# Patient Record
Sex: FEMALE | Race: WHITE | NOT HISPANIC OR LATINO | Employment: FULL TIME | URBAN - METROPOLITAN AREA
[De-identification: names, ages, dates, MRNs, and addresses within clinical notes are randomized per-mention and may not be internally consistent; named-entity substitution may affect disease eponyms.]

---

## 2021-07-21 ENCOUNTER — HOSPITAL ENCOUNTER (OUTPATIENT)
Facility: HOSPITAL | Age: 51
Discharge: HOME | End: 2021-07-23
Attending: EMERGENCY MEDICINE | Admitting: INTERNAL MEDICINE

## 2021-07-21 ENCOUNTER — APPOINTMENT (OUTPATIENT)
Dept: GENERAL RADIOLOGY | Facility: HOSPITAL | Age: 51
End: 2021-07-21

## 2021-07-21 DIAGNOSIS — Z86.39 HISTORY OF HYPERLIPIDEMIA: ICD-10-CM

## 2021-07-21 DIAGNOSIS — I20.0 UNSTABLE ANGINA (HCC): Primary | ICD-10-CM

## 2021-07-21 DIAGNOSIS — Z86.79 HISTORY OF HYPERTENSION: ICD-10-CM

## 2021-07-21 DIAGNOSIS — F17.200 SMOKER: ICD-10-CM

## 2021-07-21 LAB
ALBUMIN SERPL-MCNC: 4.2 G/DL (ref 3.5–5.2)
ALBUMIN/GLOB SERPL: 1.5 G/DL
ALP SERPL-CCNC: 67 U/L (ref 39–117)
ALT SERPL W P-5'-P-CCNC: 19 U/L (ref 1–33)
ANION GAP SERPL CALCULATED.3IONS-SCNC: 14 MMOL/L (ref 5–15)
AST SERPL-CCNC: 21 U/L (ref 1–32)
B-HCG UR QL: NEGATIVE
BASOPHILS # BLD AUTO: 0.1 10*3/MM3 (ref 0–0.2)
BASOPHILS NFR BLD AUTO: 0.8 % (ref 0–1.5)
BILIRUB SERPL-MCNC: 0.2 MG/DL (ref 0–1.2)
BUN SERPL-MCNC: 14 MG/DL (ref 6–20)
BUN/CREAT SERPL: 18.2 (ref 7–25)
CALCIUM SPEC-SCNC: 9.3 MG/DL (ref 8.6–10.5)
CHLORIDE SERPL-SCNC: 104 MMOL/L (ref 98–107)
CO2 SERPL-SCNC: 23 MMOL/L (ref 22–29)
CREAT SERPL-MCNC: 0.77 MG/DL (ref 0.57–1)
DEPRECATED RDW RBC AUTO: 49.2 FL (ref 37–54)
EOSINOPHIL # BLD AUTO: 0.52 10*3/MM3 (ref 0–0.4)
EOSINOPHIL NFR BLD AUTO: 4.2 % (ref 0.3–6.2)
ERYTHROCYTE [DISTWIDTH] IN BLOOD BY AUTOMATED COUNT: 14.4 % (ref 12.3–15.4)
GFR SERPL CREATININE-BSD FRML MDRD: 79 ML/MIN/1.73
GLOBULIN UR ELPH-MCNC: 2.8 GM/DL
GLUCOSE SERPL-MCNC: 79 MG/DL (ref 65–99)
HCT VFR BLD AUTO: 41.6 % (ref 34–46.6)
HGB BLD-MCNC: 13.2 G/DL (ref 12–15.9)
HOLD SPECIMEN: NORMAL
HOLD SPECIMEN: NORMAL
IMM GRANULOCYTES # BLD AUTO: 0.04 10*3/MM3 (ref 0–0.05)
IMM GRANULOCYTES NFR BLD AUTO: 0.3 % (ref 0–0.5)
INTERNAL NEGATIVE CONTROL: NORMAL
INTERNAL POSITIVE CONTROL: NORMAL
LIPASE SERPL-CCNC: 48 U/L (ref 13–60)
LYMPHOCYTES # BLD AUTO: 4.01 10*3/MM3 (ref 0.7–3.1)
LYMPHOCYTES NFR BLD AUTO: 32.5 % (ref 19.6–45.3)
Lab: NORMAL
MCH RBC QN AUTO: 29.7 PG (ref 26.6–33)
MCHC RBC AUTO-ENTMCNC: 31.7 G/DL (ref 31.5–35.7)
MCV RBC AUTO: 93.7 FL (ref 79–97)
MONOCYTES # BLD AUTO: 0.88 10*3/MM3 (ref 0.1–0.9)
MONOCYTES NFR BLD AUTO: 7.1 % (ref 5–12)
NEUTROPHILS NFR BLD AUTO: 55.1 % (ref 42.7–76)
NEUTROPHILS NFR BLD AUTO: 6.79 10*3/MM3 (ref 1.7–7)
NRBC BLD AUTO-RTO: 0 /100 WBC (ref 0–0.2)
NT-PROBNP SERPL-MCNC: 17.7 PG/ML (ref 0–900)
PLAT MORPH BLD: NORMAL
PLATELET # BLD AUTO: 288 10*3/MM3 (ref 140–450)
PMV BLD AUTO: 11 FL (ref 6–12)
POTASSIUM SERPL-SCNC: 3.6 MMOL/L (ref 3.5–5.2)
PROT SERPL-MCNC: 7 G/DL (ref 6–8.5)
RBC # BLD AUTO: 4.44 10*6/MM3 (ref 3.77–5.28)
RBC MORPH BLD: NORMAL
SODIUM SERPL-SCNC: 141 MMOL/L (ref 136–145)
TROPONIN T SERPL-MCNC: <0.01 NG/ML (ref 0–0.03)
WBC # BLD AUTO: 12.34 10*3/MM3 (ref 3.4–10.8)
WBC MORPH BLD: NORMAL
WHOLE BLOOD HOLD SPECIMEN: NORMAL

## 2021-07-21 PROCEDURE — 85007 BL SMEAR W/DIFF WBC COUNT: CPT

## 2021-07-21 PROCEDURE — 81025 URINE PREGNANCY TEST: CPT | Performed by: EMERGENCY MEDICINE

## 2021-07-21 PROCEDURE — 93005 ELECTROCARDIOGRAM TRACING: CPT | Performed by: EMERGENCY MEDICINE

## 2021-07-21 PROCEDURE — 85025 COMPLETE CBC W/AUTO DIFF WBC: CPT

## 2021-07-21 PROCEDURE — 93005 ELECTROCARDIOGRAM TRACING: CPT

## 2021-07-21 PROCEDURE — 71045 X-RAY EXAM CHEST 1 VIEW: CPT

## 2021-07-21 PROCEDURE — 80053 COMPREHEN METABOLIC PANEL: CPT

## 2021-07-21 PROCEDURE — 83880 ASSAY OF NATRIURETIC PEPTIDE: CPT

## 2021-07-21 PROCEDURE — 84484 ASSAY OF TROPONIN QUANT: CPT

## 2021-07-21 PROCEDURE — 99284 EMERGENCY DEPT VISIT MOD MDM: CPT

## 2021-07-21 PROCEDURE — 83690 ASSAY OF LIPASE: CPT

## 2021-07-21 PROCEDURE — 81025 URINE PREGNANCY TEST: CPT

## 2021-07-21 RX ORDER — CHOLECALCIFEROL (VITAMIN D3) 1250 MCG
CAPSULE ORAL
COMMUNITY

## 2021-07-21 RX ORDER — ASPIRIN 81 MG/1
324 TABLET, CHEWABLE ORAL ONCE
Status: COMPLETED | OUTPATIENT
Start: 2021-07-21 | End: 2021-07-21

## 2021-07-21 RX ORDER — SODIUM CHLORIDE 0.9 % (FLUSH) 0.9 %
10 SYRINGE (ML) INJECTION AS NEEDED
Status: DISCONTINUED | OUTPATIENT
Start: 2021-07-21 | End: 2021-07-23 | Stop reason: HOSPADM

## 2021-07-21 RX ORDER — ATORVASTATIN CALCIUM 40 MG/1
40 TABLET, FILM COATED ORAL DAILY
COMMUNITY

## 2021-07-21 RX ORDER — SUCRALFATE 1 G/1
1 TABLET ORAL 4 TIMES DAILY
COMMUNITY
End: 2021-09-01

## 2021-07-21 RX ORDER — ASPIRIN 81 MG/1
81 TABLET ORAL DAILY
COMMUNITY

## 2021-07-21 RX ORDER — PAROXETINE HYDROCHLORIDE 40 MG/1
40 TABLET, FILM COATED ORAL EVERY MORNING
COMMUNITY

## 2021-07-21 RX ORDER — MULTIPLE VITAMINS W/ MINERALS TAB 9MG-400MCG
1 TAB ORAL DAILY
COMMUNITY
End: 2022-01-20

## 2021-07-21 RX ORDER — CHOLECALCIFEROL (VITAMIN D3) 125 MCG
500 CAPSULE ORAL 3 TIMES WEEKLY
COMMUNITY
End: 2022-01-20

## 2021-07-21 RX ORDER — BISOPROLOL FUMARATE 5 MG/1
5 TABLET, FILM COATED ORAL DAILY
COMMUNITY
End: 2022-01-20

## 2021-07-21 RX ORDER — LISINOPRIL AND HYDROCHLOROTHIAZIDE 12.5; 1 MG/1; MG/1
1 TABLET ORAL 2 TIMES DAILY
COMMUNITY
End: 2021-09-01 | Stop reason: SDUPTHER

## 2021-07-21 RX ORDER — NITROGLYCERIN 0.4 MG/1
0.4 TABLET SUBLINGUAL
COMMUNITY
End: 2021-09-01

## 2021-07-21 RX ADMIN — ASPIRIN 81 MG CHEWABLE TABLET 243 MG: 81 TABLET CHEWABLE at 22:42

## 2021-07-22 ENCOUNTER — APPOINTMENT (OUTPATIENT)
Dept: CARDIOLOGY | Facility: HOSPITAL | Age: 51
End: 2021-07-22

## 2021-07-22 PROBLEM — F41.9 ANXIETY: Status: ACTIVE | Noted: 2021-07-22

## 2021-07-22 PROBLEM — I25.118 CORONARY ARTERY DISEASE OF NATIVE ARTERY OF NATIVE HEART WITH STABLE ANGINA PECTORIS: Status: ACTIVE | Noted: 2021-07-22

## 2021-07-22 PROBLEM — Z72.0 TOBACCO USE: Status: ACTIVE | Noted: 2021-07-22

## 2021-07-22 PROBLEM — I10 HTN (HYPERTENSION): Status: ACTIVE | Noted: 2021-07-22

## 2021-07-22 PROBLEM — E78.5 HLD (HYPERLIPIDEMIA): Status: ACTIVE | Noted: 2021-07-22

## 2021-07-22 PROBLEM — I20.9 ANGINA PECTORIS (HCC): Status: ACTIVE | Noted: 2021-07-22

## 2021-07-22 LAB
ANION GAP SERPL CALCULATED.3IONS-SCNC: 12 MMOL/L (ref 5–15)
ASCENDING AORTA: 2.9 CM
BASOPHILS # BLD AUTO: 0.08 10*3/MM3 (ref 0–0.2)
BASOPHILS NFR BLD AUTO: 0.8 % (ref 0–1.5)
BH CV ECHO MEAS - AO MAX PG (FULL): 2.3 MMHG
BH CV ECHO MEAS - AO MAX PG: 6.3 MMHG
BH CV ECHO MEAS - AO MEAN PG (FULL): 1.6 MMHG
BH CV ECHO MEAS - AO MEAN PG: 3.4 MMHG
BH CV ECHO MEAS - AO ROOT AREA (BSA CORRECTED): 1.6
BH CV ECHO MEAS - AO ROOT AREA: 7 CM^2
BH CV ECHO MEAS - AO ROOT DIAM: 3 CM
BH CV ECHO MEAS - AO V2 MAX: 126 CM/SEC
BH CV ECHO MEAS - AO V2 MEAN: 86 CM/SEC
BH CV ECHO MEAS - AO V2 VTI: 26.3 CM
BH CV ECHO MEAS - ASC AORTA: 2.9 CM
BH CV ECHO MEAS - AVA(I,A): 2.1 CM^2
BH CV ECHO MEAS - AVA(I,D): 2.1 CM^2
BH CV ECHO MEAS - AVA(V,A): 2 CM^2
BH CV ECHO MEAS - AVA(V,D): 2 CM^2
BH CV ECHO MEAS - BSA(HAYCOCK): 1.9 M^2
BH CV ECHO MEAS - BSA: 1.8 M^2
BH CV ECHO MEAS - BZI_BMI: 30 KILOGRAMS/M^2
BH CV ECHO MEAS - BZI_METRIC_HEIGHT: 162.6 CM
BH CV ECHO MEAS - BZI_METRIC_WEIGHT: 79.4 KG
BH CV ECHO MEAS - EDV(CUBED): 92.1 ML
BH CV ECHO MEAS - EDV(MOD-SP2): 122 ML
BH CV ECHO MEAS - EDV(MOD-SP4): 101 ML
BH CV ECHO MEAS - EDV(TEICH): 93.2 ML
BH CV ECHO MEAS - EF(CUBED): 83.7 %
BH CV ECHO MEAS - EF(MOD-BP): 62 %
BH CV ECHO MEAS - EF(MOD-SP2): 63.1 %
BH CV ECHO MEAS - EF(MOD-SP4): 58.4 %
BH CV ECHO MEAS - EF(TEICH): 76.8 %
BH CV ECHO MEAS - ESV(CUBED): 15 ML
BH CV ECHO MEAS - ESV(MOD-SP2): 45 ML
BH CV ECHO MEAS - ESV(MOD-SP4): 42 ML
BH CV ECHO MEAS - ESV(TEICH): 21.6 ML
BH CV ECHO MEAS - FS: 45.4 %
BH CV ECHO MEAS - IVS/LVPW: 0.8
BH CV ECHO MEAS - IVSD: 0.72 CM
BH CV ECHO MEAS - LA DIMENSION: 3.2 CM
BH CV ECHO MEAS - LA/AO: 1.1
BH CV ECHO MEAS - LAD MAJOR: 4.9 CM
BH CV ECHO MEAS - LAT PEAK E' VEL: 12 CM/SEC
BH CV ECHO MEAS - LATERAL E/E' RATIO: 10.7
BH CV ECHO MEAS - LV DIASTOLIC VOL/BSA (35-75): 54.7 ML/M^2
BH CV ECHO MEAS - LV IVRT: 0.07 SEC
BH CV ECHO MEAS - LV MASS(C)D: 116.8 GRAMS
BH CV ECHO MEAS - LV MASS(C)DI: 63.2 GRAMS/M^2
BH CV ECHO MEAS - LV MAX PG: 4 MMHG
BH CV ECHO MEAS - LV MEAN PG: 1.9 MMHG
BH CV ECHO MEAS - LV SYSTOLIC VOL/BSA (12-30): 22.7 ML/M^2
BH CV ECHO MEAS - LV V1 MAX: 100.6 CM/SEC
BH CV ECHO MEAS - LV V1 MEAN: 64.4 CM/SEC
BH CV ECHO MEAS - LV V1 VTI: 22.2 CM
BH CV ECHO MEAS - LVIDD: 4.5 CM
BH CV ECHO MEAS - LVIDS: 2.5 CM
BH CV ECHO MEAS - LVLD AP2: 8.5 CM
BH CV ECHO MEAS - LVLD AP4: 8.5 CM
BH CV ECHO MEAS - LVLS AP2: 7.2 CM
BH CV ECHO MEAS - LVLS AP4: 7.1 CM
BH CV ECHO MEAS - LVOT AREA (M): 2.5 CM^2
BH CV ECHO MEAS - LVOT AREA: 2.5 CM^2
BH CV ECHO MEAS - LVOT DIAM: 1.8 CM
BH CV ECHO MEAS - LVPWD: 0.9 CM
BH CV ECHO MEAS - MED PEAK E' VEL: 10.2 CM/SEC
BH CV ECHO MEAS - MEDIAL E/E' RATIO: 12.6
BH CV ECHO MEAS - MV A MAX VEL: 73.4 CM/SEC
BH CV ECHO MEAS - MV DEC SLOPE: 529.3 CM/SEC^2
BH CV ECHO MEAS - MV DEC TIME: 0.13 SEC
BH CV ECHO MEAS - MV E MAX VEL: 130.3 CM/SEC
BH CV ECHO MEAS - MV E/A: 1.8
BH CV ECHO MEAS - MV MAX PG: 4.9 MMHG
BH CV ECHO MEAS - MV MEAN PG: 1.9 MMHG
BH CV ECHO MEAS - MV P1/2T MAX VEL: 126 CM/SEC
BH CV ECHO MEAS - MV P1/2T: 69.7 MSEC
BH CV ECHO MEAS - MV V2 MAX: 110.8 CM/SEC
BH CV ECHO MEAS - MV V2 MEAN: 60.8 CM/SEC
BH CV ECHO MEAS - MV V2 VTI: 29.5 CM
BH CV ECHO MEAS - MVA P1/2T LCG: 1.7 CM^2
BH CV ECHO MEAS - MVA(P1/2T): 3.2 CM^2
BH CV ECHO MEAS - MVA(VTI): 1.9 CM^2
BH CV ECHO MEAS - PA ACC SLOPE: 502.3 CM/SEC^2
BH CV ECHO MEAS - PA ACC TIME: 0.12 SEC
BH CV ECHO MEAS - PA MAX PG: 1.8 MMHG
BH CV ECHO MEAS - PA PR(ACCEL): 25.1 MMHG
BH CV ECHO MEAS - PA V2 MAX: 67.1 CM/SEC
BH CV ECHO MEAS - RAP SYSTOLE: 3 MMHG
BH CV ECHO MEAS - RVSP: 17 MMHG
BH CV ECHO MEAS - SI(AO): 100.2 ML/M^2
BH CV ECHO MEAS - SI(CUBED): 41.7 ML/M^2
BH CV ECHO MEAS - SI(LVOT): 30.3 ML/M^2
BH CV ECHO MEAS - SI(MOD-SP2): 41.7 ML/M^2
BH CV ECHO MEAS - SI(MOD-SP4): 31.9 ML/M^2
BH CV ECHO MEAS - SI(TEICH): 38.8 ML/M^2
BH CV ECHO MEAS - SV(AO): 185.1 ML
BH CV ECHO MEAS - SV(CUBED): 77.1 ML
BH CV ECHO MEAS - SV(LVOT): 56 ML
BH CV ECHO MEAS - SV(MOD-SP2): 77 ML
BH CV ECHO MEAS - SV(MOD-SP4): 59 ML
BH CV ECHO MEAS - SV(TEICH): 71.6 ML
BH CV ECHO MEAS - TAPSE (>1.6): 2 CM
BH CV ECHO MEAS - TR MAX PG: 14 MMHG
BH CV ECHO MEAS - TR MAX VEL: 187 CM/SEC
BH CV ECHO MEASUREMENTS AVERAGE E/E' RATIO: 11.74
BH CV VAS BP RIGHT ARM: NORMAL MMHG
BH CV XLRA - RV BASE: 3.6 CM
BH CV XLRA - RV LENGTH: 6.9 CM
BH CV XLRA - RV MID: 2.2 CM
BH CV XLRA - TDI S': 12.3 CM/SEC
BILIRUB UR QL STRIP: NEGATIVE
BUN SERPL-MCNC: 12 MG/DL (ref 6–20)
BUN/CREAT SERPL: 15.8 (ref 7–25)
CALCIUM SPEC-SCNC: 8.8 MG/DL (ref 8.6–10.5)
CHLORIDE SERPL-SCNC: 108 MMOL/L (ref 98–107)
CHOLEST SERPL-MCNC: 137 MG/DL (ref 0–200)
CLARITY UR: CLEAR
CO2 SERPL-SCNC: 21 MMOL/L (ref 22–29)
COLOR UR: YELLOW
CREAT SERPL-MCNC: 0.76 MG/DL (ref 0.57–1)
D DIMER PPP FEU-MCNC: 0.52 MCGFEU/ML (ref 0–0.56)
DEPRECATED RDW RBC AUTO: 48.1 FL (ref 37–54)
EOSINOPHIL # BLD AUTO: 0.4 10*3/MM3 (ref 0–0.4)
EOSINOPHIL NFR BLD AUTO: 4.1 % (ref 0.3–6.2)
ERYTHROCYTE [DISTWIDTH] IN BLOOD BY AUTOMATED COUNT: 14.3 % (ref 12.3–15.4)
GFR SERPL CREATININE-BSD FRML MDRD: 80 ML/MIN/1.73
GLUCOSE SERPL-MCNC: 103 MG/DL (ref 65–99)
GLUCOSE UR STRIP-MCNC: NEGATIVE MG/DL
HBA1C MFR BLD: 5.2 % (ref 4.8–5.6)
HCT VFR BLD AUTO: 38.7 % (ref 34–46.6)
HDLC SERPL-MCNC: 38 MG/DL (ref 40–60)
HGB BLD-MCNC: 12.4 G/DL (ref 12–15.9)
HGB UR QL STRIP.AUTO: NEGATIVE
HOLD SPECIMEN: NORMAL
IMM GRANULOCYTES # BLD AUTO: 0.02 10*3/MM3 (ref 0–0.05)
IMM GRANULOCYTES NFR BLD AUTO: 0.2 % (ref 0–0.5)
IVRT: 65 MSEC
KETONES UR QL STRIP: NEGATIVE
LDLC SERPL CALC-MCNC: 83 MG/DL (ref 0–100)
LDLC/HDLC SERPL: 2.18 {RATIO}
LEFT ATRIUM VOLUME INDEX: 30.8 ML/M^2
LEFT ATRIUM VOLUME: 57 ML
LEUKOCYTE ESTERASE UR QL STRIP.AUTO: NEGATIVE
LYMPHOCYTES # BLD AUTO: 3.36 10*3/MM3 (ref 0.7–3.1)
LYMPHOCYTES NFR BLD AUTO: 34.7 % (ref 19.6–45.3)
MAGNESIUM SERPL-MCNC: 1.8 MG/DL (ref 1.6–2.6)
MAGNESIUM SERPL-MCNC: 1.9 MG/DL (ref 1.6–2.6)
MCH RBC QN AUTO: 29.6 PG (ref 26.6–33)
MCHC RBC AUTO-ENTMCNC: 32 G/DL (ref 31.5–35.7)
MCV RBC AUTO: 92.4 FL (ref 79–97)
MONOCYTES # BLD AUTO: 0.59 10*3/MM3 (ref 0.1–0.9)
MONOCYTES NFR BLD AUTO: 6.1 % (ref 5–12)
NEUTROPHILS NFR BLD AUTO: 5.24 10*3/MM3 (ref 1.7–7)
NEUTROPHILS NFR BLD AUTO: 54.1 % (ref 42.7–76)
NITRITE UR QL STRIP: NEGATIVE
NRBC BLD AUTO-RTO: 0 /100 WBC (ref 0–0.2)
PH UR STRIP.AUTO: 7 [PH] (ref 5–8)
PLATELET # BLD AUTO: 242 10*3/MM3 (ref 140–450)
PMV BLD AUTO: 11.1 FL (ref 6–12)
POTASSIUM SERPL-SCNC: 3.8 MMOL/L (ref 3.5–5.2)
PROCALCITONIN SERPL-MCNC: <0.02 NG/ML (ref 0–0.25)
PROT UR QL STRIP: NEGATIVE
QT INTERVAL: 356 MS
QT INTERVAL: 382 MS
QTC INTERVAL: 390 MS
QTC INTERVAL: 413 MS
RBC # BLD AUTO: 4.19 10*6/MM3 (ref 3.77–5.28)
SARS-COV-2 RNA RESP QL NAA+PROBE: NOT DETECTED
SODIUM SERPL-SCNC: 141 MMOL/L (ref 136–145)
SP GR UR STRIP: 1.01 (ref 1–1.03)
TRIGL SERPL-MCNC: 81 MG/DL (ref 0–150)
TROPONIN T SERPL-MCNC: <0.01 NG/ML (ref 0–0.03)
TROPONIN T SERPL-MCNC: <0.01 NG/ML (ref 0–0.03)
TSH SERPL DL<=0.05 MIU/L-ACNC: 1.27 UIU/ML (ref 0.27–4.2)
UROBILINOGEN UR QL STRIP: NORMAL
VLDLC SERPL-MCNC: 16 MG/DL (ref 5–40)
WBC # BLD AUTO: 9.69 10*3/MM3 (ref 3.4–10.8)

## 2021-07-22 PROCEDURE — 85379 FIBRIN DEGRADATION QUANT: CPT | Performed by: NURSE PRACTITIONER

## 2021-07-22 PROCEDURE — 80048 BASIC METABOLIC PNL TOTAL CA: CPT | Performed by: NURSE PRACTITIONER

## 2021-07-22 PROCEDURE — U0003 INFECTIOUS AGENT DETECTION BY NUCLEIC ACID (DNA OR RNA); SEVERE ACUTE RESPIRATORY SYNDROME CORONAVIRUS 2 (SARS-COV-2) (CORONAVIRUS DISEASE [COVID-19]), AMPLIFIED PROBE TECHNIQUE, MAKING USE OF HIGH THROUGHPUT TECHNOLOGIES AS DESCRIBED BY CMS-2020-01-R: HCPCS | Performed by: INTERNAL MEDICINE

## 2021-07-22 PROCEDURE — 99244 OFF/OP CNSLTJ NEW/EST MOD 40: CPT | Performed by: INTERNAL MEDICINE

## 2021-07-22 PROCEDURE — G0378 HOSPITAL OBSERVATION PER HR: HCPCS

## 2021-07-22 PROCEDURE — 93005 ELECTROCARDIOGRAM TRACING: CPT | Performed by: EMERGENCY MEDICINE

## 2021-07-22 PROCEDURE — C1753 CATH, INTRAVAS ULTRASOUND: HCPCS | Performed by: INTERNAL MEDICINE

## 2021-07-22 PROCEDURE — C1887 CATHETER, GUIDING: HCPCS | Performed by: INTERNAL MEDICINE

## 2021-07-22 PROCEDURE — C1769 GUIDE WIRE: HCPCS | Performed by: INTERNAL MEDICINE

## 2021-07-22 PROCEDURE — 84484 ASSAY OF TROPONIN QUANT: CPT | Performed by: INTERNAL MEDICINE

## 2021-07-22 PROCEDURE — 81003 URINALYSIS AUTO W/O SCOPE: CPT | Performed by: NURSE PRACTITIONER

## 2021-07-22 PROCEDURE — 96374 THER/PROPH/DIAG INJ IV PUSH: CPT

## 2021-07-22 PROCEDURE — 92978 ENDOLUMINL IVUS OCT C 1ST: CPT | Performed by: INTERNAL MEDICINE

## 2021-07-22 PROCEDURE — 84145 PROCALCITONIN (PCT): CPT | Performed by: NURSE PRACTITIONER

## 2021-07-22 PROCEDURE — C1894 INTRO/SHEATH, NON-LASER: HCPCS | Performed by: INTERNAL MEDICINE

## 2021-07-22 PROCEDURE — 25010000002 HEPARIN (PORCINE) PER 1000 UNITS: Performed by: INTERNAL MEDICINE

## 2021-07-22 PROCEDURE — 84484 ASSAY OF TROPONIN QUANT: CPT | Performed by: EMERGENCY MEDICINE

## 2021-07-22 PROCEDURE — 83036 HEMOGLOBIN GLYCOSYLATED A1C: CPT | Performed by: NURSE PRACTITIONER

## 2021-07-22 PROCEDURE — 96361 HYDRATE IV INFUSION ADD-ON: CPT

## 2021-07-22 PROCEDURE — 25010000003 LIDOCAINE 1 % SOLUTION: Performed by: INTERNAL MEDICINE

## 2021-07-22 PROCEDURE — 93005 ELECTROCARDIOGRAM TRACING: CPT | Performed by: NURSE PRACTITIONER

## 2021-07-22 PROCEDURE — 25010000002 MIDAZOLAM PER 1 MG: Performed by: INTERNAL MEDICINE

## 2021-07-22 PROCEDURE — 93458 L HRT ARTERY/VENTRICLE ANGIO: CPT | Performed by: INTERNAL MEDICINE

## 2021-07-22 PROCEDURE — 25010000002 FENTANYL CITRATE (PF) 50 MCG/ML SOLUTION: Performed by: INTERNAL MEDICINE

## 2021-07-22 PROCEDURE — 83735 ASSAY OF MAGNESIUM: CPT | Performed by: NURSE PRACTITIONER

## 2021-07-22 PROCEDURE — 99220 PR INITIAL OBSERVATION CARE/DAY 70 MINUTES: CPT | Performed by: NURSE PRACTITIONER

## 2021-07-22 PROCEDURE — 80061 LIPID PANEL: CPT | Performed by: NURSE PRACTITIONER

## 2021-07-22 PROCEDURE — 85025 COMPLETE CBC W/AUTO DIFF WBC: CPT | Performed by: NURSE PRACTITIONER

## 2021-07-22 PROCEDURE — 93010 ELECTROCARDIOGRAM REPORT: CPT | Performed by: INTERNAL MEDICINE

## 2021-07-22 PROCEDURE — 93306 TTE W/DOPPLER COMPLETE: CPT

## 2021-07-22 PROCEDURE — 84443 ASSAY THYROID STIM HORMONE: CPT | Performed by: NURSE PRACTITIONER

## 2021-07-22 PROCEDURE — 0 IOPAMIDOL PER 1 ML: Performed by: INTERNAL MEDICINE

## 2021-07-22 PROCEDURE — 93306 TTE W/DOPPLER COMPLETE: CPT | Performed by: INTERNAL MEDICINE

## 2021-07-22 PROCEDURE — 96376 TX/PRO/DX INJ SAME DRUG ADON: CPT

## 2021-07-22 RX ORDER — MIDAZOLAM HYDROCHLORIDE 1 MG/ML
INJECTION INTRAMUSCULAR; INTRAVENOUS AS NEEDED
Status: DISCONTINUED | OUTPATIENT
Start: 2021-07-22 | End: 2021-07-22 | Stop reason: HOSPADM

## 2021-07-22 RX ORDER — SODIUM CHLORIDE 0.9 % (FLUSH) 0.9 %
10 SYRINGE (ML) INJECTION AS NEEDED
Status: DISCONTINUED | OUTPATIENT
Start: 2021-07-22 | End: 2021-07-23 | Stop reason: HOSPADM

## 2021-07-22 RX ORDER — LANOLIN ALCOHOL/MO/W.PET/CERES
500 CREAM (GRAM) TOPICAL 3 TIMES WEEKLY
Status: DISCONTINUED | OUTPATIENT
Start: 2021-07-23 | End: 2021-07-23 | Stop reason: HOSPADM

## 2021-07-22 RX ORDER — LIDOCAINE HYDROCHLORIDE 10 MG/ML
INJECTION, SOLUTION INFILTRATION; PERINEURAL AS NEEDED
Status: DISCONTINUED | OUTPATIENT
Start: 2021-07-22 | End: 2021-07-22 | Stop reason: HOSPADM

## 2021-07-22 RX ORDER — SODIUM CHLORIDE 9 MG/ML
100 INJECTION, SOLUTION INTRAVENOUS CONTINUOUS
Status: ACTIVE | OUTPATIENT
Start: 2021-07-22 | End: 2021-07-22

## 2021-07-22 RX ORDER — ATORVASTATIN CALCIUM 40 MG/1
40 TABLET, FILM COATED ORAL NIGHTLY
Status: DISCONTINUED | OUTPATIENT
Start: 2021-07-22 | End: 2021-07-23 | Stop reason: HOSPADM

## 2021-07-22 RX ORDER — PAROXETINE HYDROCHLORIDE 20 MG/1
40 TABLET, FILM COATED ORAL DAILY
Status: DISCONTINUED | OUTPATIENT
Start: 2021-07-22 | End: 2021-07-23 | Stop reason: HOSPADM

## 2021-07-22 RX ORDER — HEPARIN SODIUM 1000 [USP'U]/ML
INJECTION, SOLUTION INTRAVENOUS; SUBCUTANEOUS AS NEEDED
Status: DISCONTINUED | OUTPATIENT
Start: 2021-07-22 | End: 2021-07-22 | Stop reason: HOSPADM

## 2021-07-22 RX ORDER — HYDROXYZINE HYDROCHLORIDE 25 MG/1
25 TABLET, FILM COATED ORAL 3 TIMES DAILY PRN
Status: DISCONTINUED | OUTPATIENT
Start: 2021-07-22 | End: 2021-07-23 | Stop reason: HOSPADM

## 2021-07-22 RX ORDER — AMOXICILLIN 250 MG
2 CAPSULE ORAL 2 TIMES DAILY
Status: DISCONTINUED | OUTPATIENT
Start: 2021-07-22 | End: 2021-07-23 | Stop reason: HOSPADM

## 2021-07-22 RX ORDER — SODIUM CHLORIDE 0.9 % (FLUSH) 0.9 %
10 SYRINGE (ML) INJECTION EVERY 12 HOURS SCHEDULED
Status: DISCONTINUED | OUTPATIENT
Start: 2021-07-22 | End: 2021-07-23 | Stop reason: HOSPADM

## 2021-07-22 RX ORDER — NICOTINE 21 MG/24HR
1 PATCH, TRANSDERMAL 24 HOURS TRANSDERMAL EVERY 24 HOURS
Status: DISCONTINUED | OUTPATIENT
Start: 2021-07-22 | End: 2021-07-23 | Stop reason: HOSPADM

## 2021-07-22 RX ORDER — BISACODYL 5 MG/1
5 TABLET, DELAYED RELEASE ORAL DAILY PRN
Status: DISCONTINUED | OUTPATIENT
Start: 2021-07-22 | End: 2021-07-23 | Stop reason: HOSPADM

## 2021-07-22 RX ORDER — ASPIRIN 81 MG/1
81 TABLET ORAL DAILY
Status: DISCONTINUED | OUTPATIENT
Start: 2021-07-22 | End: 2021-07-23 | Stop reason: HOSPADM

## 2021-07-22 RX ORDER — ACETAMINOPHEN 160 MG/5ML
650 SOLUTION ORAL EVERY 4 HOURS PRN
Status: DISCONTINUED | OUTPATIENT
Start: 2021-07-22 | End: 2021-07-23 | Stop reason: HOSPADM

## 2021-07-22 RX ORDER — PANTOPRAZOLE SODIUM 40 MG/1
40 TABLET, DELAYED RELEASE ORAL
Status: DISCONTINUED | OUTPATIENT
Start: 2021-07-22 | End: 2021-07-23 | Stop reason: HOSPADM

## 2021-07-22 RX ORDER — ACETAMINOPHEN 650 MG/1
650 SUPPOSITORY RECTAL EVERY 4 HOURS PRN
Status: DISCONTINUED | OUTPATIENT
Start: 2021-07-22 | End: 2021-07-23 | Stop reason: HOSPADM

## 2021-07-22 RX ORDER — FENTANYL CITRATE 50 UG/ML
INJECTION, SOLUTION INTRAMUSCULAR; INTRAVENOUS AS NEEDED
Status: DISCONTINUED | OUTPATIENT
Start: 2021-07-22 | End: 2021-07-22 | Stop reason: HOSPADM

## 2021-07-22 RX ORDER — ONDANSETRON 2 MG/ML
4 INJECTION INTRAMUSCULAR; INTRAVENOUS EVERY 6 HOURS PRN
Status: DISCONTINUED | OUTPATIENT
Start: 2021-07-22 | End: 2021-07-23 | Stop reason: HOSPADM

## 2021-07-22 RX ORDER — FAMOTIDINE 10 MG/ML
20 INJECTION, SOLUTION INTRAVENOUS EVERY 12 HOURS SCHEDULED
Status: COMPLETED | OUTPATIENT
Start: 2021-07-22 | End: 2021-07-22

## 2021-07-22 RX ORDER — POLYETHYLENE GLYCOL 3350 17 G/17G
17 POWDER, FOR SOLUTION ORAL DAILY PRN
Status: DISCONTINUED | OUTPATIENT
Start: 2021-07-22 | End: 2021-07-23 | Stop reason: HOSPADM

## 2021-07-22 RX ORDER — ACETAMINOPHEN 325 MG/1
650 TABLET ORAL EVERY 4 HOURS PRN
Status: DISCONTINUED | OUTPATIENT
Start: 2021-07-22 | End: 2021-07-23 | Stop reason: HOSPADM

## 2021-07-22 RX ORDER — SUCRALFATE 1 G/1
1 TABLET ORAL 4 TIMES DAILY
Status: DISCONTINUED | OUTPATIENT
Start: 2021-07-22 | End: 2021-07-23 | Stop reason: HOSPADM

## 2021-07-22 RX ORDER — BISOPROLOL FUMARATE 5 MG/1
5 TABLET, FILM COATED ORAL DAILY
Status: DISCONTINUED | OUTPATIENT
Start: 2021-07-22 | End: 2021-07-23 | Stop reason: HOSPADM

## 2021-07-22 RX ORDER — BISACODYL 10 MG
10 SUPPOSITORY, RECTAL RECTAL DAILY PRN
Status: DISCONTINUED | OUTPATIENT
Start: 2021-07-22 | End: 2021-07-23 | Stop reason: HOSPADM

## 2021-07-22 RX ORDER — MULTIPLE VITAMINS W/ MINERALS TAB 9MG-400MCG
1 TAB ORAL DAILY
Status: DISCONTINUED | OUTPATIENT
Start: 2021-07-22 | End: 2021-07-23 | Stop reason: HOSPADM

## 2021-07-22 RX ORDER — ONDANSETRON 4 MG/1
4 TABLET, FILM COATED ORAL EVERY 6 HOURS PRN
Status: DISCONTINUED | OUTPATIENT
Start: 2021-07-22 | End: 2021-07-23 | Stop reason: HOSPADM

## 2021-07-22 RX ORDER — SODIUM CHLORIDE 9 MG/ML
1 INJECTION, SOLUTION INTRAVENOUS CONTINUOUS
Status: ACTIVE | OUTPATIENT
Start: 2021-07-22 | End: 2021-07-22

## 2021-07-22 RX ADMIN — PAROXETINE HYDROCHLORIDE 40 MG: 20 TABLET, FILM COATED ORAL at 08:13

## 2021-07-22 RX ADMIN — BISOPROLOL FUMARATE 5 MG: 5 TABLET, FILM COATED ORAL at 08:12

## 2021-07-22 RX ADMIN — FAMOTIDINE 20 MG: 10 INJECTION, SOLUTION INTRAVENOUS at 08:13

## 2021-07-22 RX ADMIN — ATORVASTATIN CALCIUM 40 MG: 40 TABLET, FILM COATED ORAL at 03:08

## 2021-07-22 RX ADMIN — SODIUM CHLORIDE 1 ML/KG/HR: 9 INJECTION, SOLUTION INTRAVENOUS at 18:49

## 2021-07-22 RX ADMIN — PANTOPRAZOLE SODIUM 40 MG: 40 TABLET, DELAYED RELEASE ORAL at 03:08

## 2021-07-22 RX ADMIN — LIDOCAINE HYDROCHLORIDE: 20 SOLUTION ORAL; TOPICAL at 03:30

## 2021-07-22 RX ADMIN — SUCRALFATE 1 G: 1 TABLET ORAL at 20:25

## 2021-07-22 RX ADMIN — SODIUM CHLORIDE 100 ML/HR: 9 INJECTION, SOLUTION INTRAVENOUS at 01:22

## 2021-07-22 RX ADMIN — FAMOTIDINE 20 MG: 10 INJECTION, SOLUTION INTRAVENOUS at 03:08

## 2021-07-22 RX ADMIN — ATORVASTATIN CALCIUM 40 MG: 40 TABLET, FILM COATED ORAL at 20:29

## 2021-07-22 RX ADMIN — SODIUM CHLORIDE, PRESERVATIVE FREE 10 ML: 5 INJECTION INTRAVENOUS at 20:25

## 2021-07-22 RX ADMIN — SUCRALFATE 1 G: 1 TABLET ORAL at 08:13

## 2021-07-22 RX ADMIN — MULTIPLE VITAMINS W/ MINERALS TAB 1 TABLET: TAB at 08:12

## 2021-07-22 RX ADMIN — HYDROCHLOROTHIAZIDE: 12.5 CAPSULE ORAL at 08:12

## 2021-07-22 RX ADMIN — ASPIRIN 81 MG: 81 TABLET, COATED ORAL at 08:12

## 2021-07-22 RX ADMIN — DOCUSATE SODIUM 50MG AND SENNOSIDES 8.6MG 2 TABLET: 8.6; 5 TABLET, FILM COATED ORAL at 08:13

## 2021-07-22 RX ADMIN — SUCRALFATE 1 G: 1 TABLET ORAL at 18:48

## 2021-07-22 NOTE — CONSULTS
Inpatient Cardiology Consult  Consult performed by: Faye Mandel APRN  Consult ordered by: Nunu Bowen APRN          [facility] at [facility]  Cardiovascular Consultation Note    Reason for consult: Chest pain     Patient is a 51-year-old female with a history of hypertension, hyperlipidemia, ongoing tobacco abuse and anxiety who presented to [facility] last evening with reports of progressive chest pain with radiation to her left shoulder.  She went to [facility] in May for the same symptoms.  She reports ruling out for a heart attack.  She was evaluated by Dr. Mahajan with cardiology and underwent stress testing but she could not complete it due to shortness of breath and leg cramps. A cardiac catheterization was discussed but deferred at that time.  She did follow-up in the clinic with Dr. Mahajan who started her on Zebeta and ordered a 2-week monitor for complaints of lightheadedness.  She is not received the results of her monitor.  She reports her symptoms are provoked with exertion and improved with rest.  She reports a strong family history of coronary artery disease as her mother and father both required stents and CABG in their 50s.  She is a longtime 1 pack/day smoker x30 years.  Work-up includes troponins x3 negative and EKG shows no acute ischemia or injury pattern.  Patient has been treated with a full-strength aspirin.  She is currently lying flat in the bed breathing easy on room air but does still report some substernal chest discomfort.    Cardiac risk factors: Hypertension, hyperlipidemia, tobacco abuse, family history    Past medical and surgical history, social and family history reviewed in EMR.    REVIEW OF SYSTEMS:   H&P ROS reviewed and pertinent CV ROS as noted in HPI.         Vital Sign Min/Max for last 24 hours  Temp  Min: 98 °F (36.7 °C)  Max: 98.6 °F (37 °C)   BP  Min: 113/57  Max: 148/68   Pulse  Min: 62  Max: 84   Resp  Min: 14   Max: 18   SpO2  Min: 98 %  Max: 100 %   No data recorded    No intake or output data in the 24 hours ending 21 1038        Constitutional:       Appearance: Healthy appearance. Well-developed.   Eyes:      General: Lids are normal. No scleral icterus.     Conjunctiva/sclera: Conjunctivae normal.   HENT:      Head: Normocephalic and atraumatic.   Neck:      Thyroid: No thyromegaly.      Vascular: No carotid bruit or JVD.   Pulmonary:      Effort: Pulmonary effort is normal.      Breath sounds: Normal breath sounds. No wheezing. No rhonchi. No rales.   Cardiovascular:      Normal rate. Regular rhythm.      Murmurs: There is no murmur.      No gallop. No rub.   Pulses:     Intact distal pulses.   Edema:     Peripheral edema absent.   Abdominal:      General: There is no distension.      Palpations: Abdomen is soft. There is no abdominal mass.   Musculoskeletal:      Cervical back: Normal range of motion. Skin:     General: Skin is warm and dry.      Findings: No rash.   Neurological:      General: No focal deficit present.      Mental Status: Alert and oriented to person, place, and time.      Gait: Gait is intact.   Psychiatric:         Attention and Perception: Attention normal.         Mood and Affect: Mood normal.         Behavior: Behavior normal.     Right radial 2+, normal barbeau test  EK2021 normal sinus rhythm with sinus arrhythmia    Lab Review:   Labs reviewed in the electronic medical record.  Pertinent findings include:  Lab Results   Component Value Date    GLUCOSE 103 (H) 2021    BUN 12 2021    CREATININE 0.76 2021    EGFRIFNONA 80 2021    BCR 15.8 2021    K 3.8 2021    CO2 21.0 (L) 2021    CALCIUM 8.8 2021    ALBUMIN 4.20 2021    AST 21 2021    ALT 19 2021     Lab Results   Component Value Date    WBC 9.69 2021    HGB 12.4 2021    HCT 38.7 2021    MCV 92.4 2021     2021     Lab Results    Component Value Date    CHOL 137 07/22/2021    TRIG 81 07/22/2021    HDL 38 (L) 07/22/2021    LDL 83 07/22/2021     Results from last 7 days   Lab Units 07/22/21  0509   HEMOGLOBIN A1C % 5.20            Active Hospital Problems    Diagnosis    • Angina pectoris (CMS/HCC)      · Presentation [facility] 7/21/2021 with progressive chest pain.  Troponins x3 - and EKG without acute ischemia or injury     • Essential hypertension    • HLD (hyperlipidemia)    • Anxiety    • Tobacco use             · Patient has signs and symptoms concerning for unstable angina CCS class II-III.  · Keep n.p.o. for cardiac cath with Dr. Renny Gandara today  · Further recommendations to follow  · Smoking cessation strongly encouraged, assistance offered    The risks, benefits, and alternative options of cardiac catheterization were discussed with the patient.  The risks include death, MI, stroke, infection, vascular injury requiring surgical repair and/or blood transfusion, coronary dissection, renal dysfunction/failure, allergic reaction, emergent CABG.  If PCI is needed there is a 1-2% risk of emergent CABG.  The patient is agreeable for cardiac catheterization, possible PCI or CABG. Plan is to proceed with cardiac catheterization and possible PCI.     EMMANUELLE Atkinson scribe for Dr. Renny Gandara    I,Renny Gandara M.D., personally performed the services described in this documentation as scribed by the above named individual in my presence, and it is both accurate and complete.    Renny Gandara MD, [facility]  07/22/21  11:30 EDT

## 2021-07-22 NOTE — CASE MANAGEMENT/SOCIAL WORK
Continued Stay Note  [facility]     Patient Name: Sarai Machuca  MRN: [id number]  Today's Date: 7/22/2021    Admit Date: 7/21/2021    Discharge Plan     Row Name 07/22/21 1046       Plan    Plan  Home at DC    Patient/Family in Agreement with Plan  other (see comments)    Plan Comments  No DC needs identified at this time.    Final Discharge Disposition Code  01 - home or self-care    Row Name 07/22/21 0805       Plan    Final Discharge Disposition Code  01 - home or self-care    Row Name 07/22/21 0801       Plan    Final Discharge Disposition Code  01 - home or self-care        Discharge Codes    No documentation.       Expected Discharge Date and Time     Expected Discharge Date Expected Discharge Time    Jul 23, 2021             Rama Dotson RN

## 2021-07-22 NOTE — PLAN OF CARE
Goal Outcome Evaluation:  Plan of Care Reviewed With: patient        Progress: improving  Outcome Summary: VSS. Pt was having burning chest pain this morning. Heart cat performed. Right radial site is CDI. No interventions. TR band is still on at this time. Air to be removed starting at 1830. Home in the a.m.      Problem: COPD Comorbidity  Goal: Maintenance of COPD Symptom Control  Outcome: Ongoing, Progressing     Problem: Hypertension Comorbidity  Goal: Blood Pressure in Desired Range  Outcome: Ongoing, Progressing

## 2021-07-22 NOTE — PROGRESS NOTES
[facility] Medicine Services  ADMISSION FOLLOW-UP NOTE          Patient admitted after midnight, H&P by my partner performed earlier on today's date reviewed.  Interim findings, labs, and charting also reviewed.        [facility] Hospital Problem List has been managed and updated to include any new diagnoses:  Active Hospital Problems    Diagnosis  POA   • Angina pectoris (CMS/HCC) [I20.9]  Yes   • HTN (hypertension) [I10]  Unknown   • HLD (hyperlipidemia) [E78.5]  Unknown   • Anxiety [F41.9]  Unknown   • Tobacco use [Z72.0]  Unknown      Resolved Hospital Problems   No resolved problems to display.         ADDITIONAL PLAN:  - detailed assessment and plan from admission reviewed  - Chart reviewed and patient examined    Angina  --reassuring EKG with negative troponin  --consulted cardiology given risk factors    HTN  --home meds    Leukocytosis  --unremarkable EKG        Jadiel Gaffney MD  07/22/21

## 2021-07-22 NOTE — H&P
[facility] Medicine Services  Clinical Decision Unit (CDU)  History and Physical    Patient Name: Sarai Machuca  : 1970  MRN: [id number]  Primary Care Physician: Adriana Rodriguez MD  Date of admission: 2021 11:01 PM      Subjective   Subjective     Chief Complaint:  Chest pain    HPI:  Sarai Machuca is a 51 y.o. female with a medical history significant of for HTN, HLD, anxiety who presents to [facility] ED for evaluation of chest pain in the center of her chest that radiates outward bilaterally and extends to the left breast. Denies associated diaphoresis or vomiting, has noticed nausea and mild shortness of breath. Sometimes the pain is a burning sensation. The pain has been intermittent since May but has become progressively more severe. She had a stress test at some point at [facility] but was unable to complete it and was planning to be evaluated for a heart cath. Rates the pain 4/10 on 0-10 scale. Also reports a constant pain between her shoulder blades in the back. Denies recent injury or associated activity that may cause pain. Has been having palpitations, seem worse when lying down - she does drink ~ 4 cups of coffee a day. She does smoke, reports less than 1/2 ppd and occasionally drinks alcohol. She will be admitted to the hospital medicine service for further evaluation and treatment.     COVID Details:    Symptoms:    [] NONE [] Fever []  Cough [x] Shortness of breath [] Change in taste/smell      The patient qualifies to receive the vaccine, but they have not yet received it.    Review of Systems   Constitutional: Positive for chills. Negative for diaphoresis and fever.   Eyes: Negative.    Respiratory: Positive for chest tightness and shortness of breath. Negative for cough and wheezing.    Cardiovascular: Positive for palpitations (more w/ sitting and relaxing). Chest pain: central radiates outward and above right breast and under right breast     Gastrointestinal: Positive for nausea. Negative for vomiting.   Genitourinary: Positive for dysuria.   Musculoskeletal: Positive for back pain (upper back pain between shoulder blades).   All other systems reviewed and are negative.     All other systems reviewed and negative    Personal History     Past Medical History:   Diagnosis Date   • Anxiety    • Hyperlipidemia    • Hypertension        Past Surgical History:   Procedure Laterality Date   • TUBAL ABDOMINAL LIGATION         Family History:  family history includes Heart disease (age of onset: 50) in her mother; Heart disease (age of onset: 60) in her father. Otherwise pertinent FHx was reviewed and unremarkable.     Social History:  reports that she has been smoking cigarettes. She has been smoking about 1.00 pack per day. She does not have any smokeless tobacco history on file. She reports current alcohol use. She reports that she does not use drugs.  Social History     Social History Narrative   • Not on file       Medications:  Ferrous Sulfate, PARoxetine, Vitamin D3, aspirin, atorvastatin, bisoprolol, lisinopril-hydrochlorothiazide, multivitamin with minerals, nitroglycerin, sucralfate, and vitamin B-12    No Known Allergies    Objective   Objective     Vital Signs:   Temp:  [98 °F (36.7 °C)] 98 °F (36.7 °C)  Heart Rate:  [75-84] 75  Resp:  [18] 18  BP: (121-148)/(56-68) 126/60    Physical Exam  Vitals reviewed.   Constitutional:       General: She is not in acute distress.     Appearance: She is not toxic-appearing.   HENT:      Head: Normocephalic and atraumatic.      Nose: Nose normal.      Mouth/Throat:      Mouth: Mucous membranes are moist.      Pharynx: Oropharynx is clear.   Eyes:      Extraocular Movements: Extraocular movements intact.      Pupils: Pupils are equal, round, and reactive to light.   Cardiovascular:      Rate and Rhythm: Normal rate and regular rhythm.      Pulses: Normal pulses.      Heart sounds: Normal heart sounds. No murmur  heard.   No gallop.    Pulmonary:      Effort: Pulmonary effort is normal. No respiratory distress.      Breath sounds: Normal breath sounds. No wheezing.   Abdominal:      General: Bowel sounds are normal. There is no distension.      Palpations: Abdomen is soft.      Tenderness: There is no abdominal tenderness.   Musculoskeletal:         General: Normal range of motion.      Cervical back: Normal range of motion and neck supple.   Skin:     General: Skin is warm and dry.      Capillary Refill: Capillary refill takes less than 2 seconds.   Neurological:      General: No focal deficit present.      Mental Status: She is alert and oriented to person, place, and time.   Psychiatric:         Mood and Affect: Mood normal.         Behavior: Behavior normal.         Thought Content: Thought content normal.         Judgment: Judgment normal.        Results Reviewed:  Troponin <0.010, proBNP 17.7, glu 79, creatinine 0.77, BUN 14, eGFR 79, lipase 48; WBC 12.34, Hgb 13.2, Hct 41.6; chest XR = no acute findings; EKG sinus      Assessment/Plan   Assessment / Plan     Active Hospital Problems    Diagnosis  POA   • Angina pectoris (CMS/HCC) [I20.9]  Yes   • HTN (hypertension) [I10]  Unknown   • HLD (hyperlipidemia) [E78.5]  Unknown   • Anxiety [F41.9]  Unknown   • Tobacco use [Z72.0]  Unknown       Plan:  Angina - cardiac vs GI  Palpitations  - trend troponin  - EKG in am  - continue aspirin, statin, B blocker, ARB  - cardiology consult in am  - NPO except sips with meds  - ECHO in am  - check d dimer, mag  - continue carafate  - add PPI daily, include now  - GI cocktail x 1  - pepcid IV q 12 hours x 2 doses    Leukocytosis ?reactive  - check procal, patient is afebrile  - mild dysuria, check urinalysis  - chest XR clear    HTN / HLD  - continue home BP meds w/ hold parameters  - continue statin    Anxiety  - atarax prn    Tobacco use  - nicotine patch  - cessation education    CODE STATUS:    Code Status and Medical  Interventions:   Ordered at: 07/22/21 0137     Code Status:    CPR     Medical Interventions (Level of Support Prior to Arrest):    Full     Admission Status: OBSERVATION status, however if further evaluation or treatment plans warrant, status may change.  Based upon current information, I predict patient's care encounter to be less than or equal to 2 midnights.        Discharge Blueprint (criteria for discharge):   1. Cardiology clearance  2. Vitals and labs stable    Signature: Electronically signed by EMMANUELLE Flores, 07/22/21, 2:03 AM EDT

## 2021-07-22 NOTE — PLAN OF CARE
Goal Outcome Evaluation:    VSS. Patient admitted for chest pain. Admission information completed. GI cocktail administered x1. NS infusing at 100. Patient NPO at this time. Patient currently resting in bed. Will continue to monitor.      Problem: Adult Inpatient Plan of Care  Goal: Plan of Care Review  Outcome: Ongoing, Progressing  Goal: Patient-Specific Goal (Individualized)  Outcome: Ongoing, Progressing  Goal: Absence of Hospital-Acquired Illness or Injury  Outcome: Ongoing, Progressing  Intervention: Identify and Manage Fall Risk  Recent Flowsheet Documentation  Taken 7/22/2021 0400 by Gamal James RN  Safety Promotion/Fall Prevention:   activity supervised   assistive device/personal items within reach   clutter free environment maintained   safety round/check completed   room organization consistent  Taken 7/22/2021 0230 by Gamal James RN  Safety Promotion/Fall Prevention:   activity supervised   assistive device/personal items within reach   clutter free environment maintained   safety round/check completed   room organization consistent  Intervention: Prevent Skin Injury  Recent Flowsheet Documentation  Taken 7/22/2021 0400 by Gamal James RN  Body Position: position changed independently  Skin Protection: adhesive use limited  Taken 7/22/2021 0230 by Gamal James RN  Body Position: position changed independently  Skin Protection: adhesive use limited  Intervention: Prevent and Manage VTE (venous thromboembolism) Risk  Recent Flowsheet Documentation  Taken 7/22/2021 0230 by Gamal James RN  VTE Prevention/Management: bleeding risk factor(s) identified  Goal: Optimal Comfort and Wellbeing  Outcome: Ongoing, Progressing  Intervention: Provide Person-Centered Care  Recent Flowsheet Documentation  Taken 7/22/2021 0230 by Gamal James RN  Trust Relationship/Rapport: care explained  Goal: Readiness for Transition of Care  Outcome: Ongoing, Progressing  Intervention: Mutually Develop Transition  Plan  Recent Flowsheet Documentation  Taken 7/22/2021 0236 by Gamal James, RN  Transportation Anticipated: family or friend will provide  Patient/Family Anticipated Services at Transition: none  Patient/Family Anticipates Transition to: home with family  Taken 7/22/2021 0234 by Gamal James, RN  Equipment Currently Used at Home: none     Problem: COPD Comorbidity  Goal: Maintenance of COPD Symptom Control  Outcome: Ongoing, Progressing     Problem: Hypertension Comorbidity  Goal: Blood Pressure in Desired Range  Outcome: Ongoing, Progressing

## 2021-07-22 NOTE — ED PROVIDER NOTES
[place]    EMERGENCY DEPARTMENT ENCOUNTER      Pt Name: Sarai Machuca  MRN: [id number]  YOB: 1970  Date of evaluation: 7/21/2021  Provider: Ruiz Munguia MD    CHIEF COMPLAINT       Chief Complaint   Patient presents with   • Chest Pain         HISTORY OF PRESENT ILLNESS  (Location/Symptom, Timing/Onset, Context/Setting, Quality, Duration, Modifying Factors, Severity.)   Sarai Machuca is a 51 y.o. female who presents to the emergency department with 3 months of moderate severity substernal chest tightness radiating to the left arm is worse with exertion and is associated with shortness of breath diaphoresis as well as nausea but no vomiting.  She was apparently evaluated at [facility] in May and recommended to have a cardiac catheterization but declined at that time.  She notes that symptoms have gotten worse over the course the past month.  She does take medication for hypertension and hyperlipidemia is also a smoker. Patient denies any history of VTE as well as any recent surgery, hospitalization, long distance travel, swelling or pain in the lower extremities, or exogenous hormone use.        Nursing notes were reviewed.    REVIEW OF SYSTEMS    (2-9 systems for level 4, 10 or more for level 5)   ROS:  General:  No fevers, no chills, no weakness  Cardiovascular: Chest pain  Respiratory: Shortness of breath  Gastrointestinal:  No pain, no nausea, no vomiting, no diarrhea  Musculoskeletal:  No muscle pain, no joint pain  Skin:  No rash  Neurologic:  No speech problems, no headache, no extremity numbness, no extremity tingling, no extremity weakness  Psychiatric:  No anxiety  Genitourinary:  No dysuria, no hematuria    Except as noted above the remainder of the review of systems was reviewed and negative.       PAST MEDICAL HISTORY     Past Medical History:   Diagnosis Date   • Anxiety    • Hyperlipidemia    • Hypertension          SURGICAL HISTORY       Past Surgical History:   Procedure  Laterality Date   • TUBAL ABDOMINAL LIGATION           CURRENT MEDICATIONS       Current Facility-Administered Medications:   •  acetaminophen (TYLENOL) tablet 650 mg, 650 mg, Oral, Q4H PRN **OR** acetaminophen (TYLENOL) 160 MG/5ML solution 650 mg, 650 mg, Oral, Q4H PRN **OR** acetaminophen (TYLENOL) suppository 650 mg, 650 mg, Rectal, Q4H PRN, Nunu Bowen, APRN  •  aspirin EC tablet 81 mg, 81 mg, Oral, Daily, Nunu Bowen, APRN  •  atorvastatin (LIPITOR) tablet 40 mg, 40 mg, Oral, Nightly, Nunu Bowen APRN, 40 mg at 07/22/21 0308  •  sennosides-docusate (PERICOLACE) 8.6-50 MG per tablet 2 tablet, 2 tablet, Oral, BID **AND** polyethylene glycol (MIRALAX) packet 17 g, 17 g, Oral, Daily PRN **AND** bisacodyl (DULCOLAX) EC tablet 5 mg, 5 mg, Oral, Daily PRN **AND** bisacodyl (DULCOLAX) suppository 10 mg, 10 mg, Rectal, Daily PRN, Nunu Bowen, APRN  •  bisoprolol (ZEBeta) tablet 5 mg, 5 mg, Oral, Daily, Nunu Bowen, APRN  •  famotidine (PEPCID) injection 20 mg, 20 mg, Intravenous, Q12H, Nunu Bowen, APRN, 20 mg at 07/22/21 0308  •  hydrOXYzine (ATARAX) tablet 25 mg, 25 mg, Oral, TID PRN, Nunu Bowen, APRN  •  lisinopril (PRINIVIL,ZESTRIL) 10 mg, hydroCHLOROthiazide (HYDRODIURIL) 12.5 mg for ZESTORETIC 10-12.5, , Oral, Q24H, Nunu Bowen, APRN  •  multivitamin with minerals 1 tablet, 1 tablet, Oral, Daily, Nunu Bowen, APRN  •  nicotine (NICODERM CQ) 21 MG/24HR patch 1 patch, 1 patch, Transdermal, Q24H, Nunu Bowen, APRN  •  ondansetron (ZOFRAN) tablet 4 mg, 4 mg, Oral, Q6H PRN **OR** ondansetron (ZOFRAN) injection 4 mg, 4 mg, Intravenous, Q6H PRN, Nunu Bowen, APRN  •  pantoprazole (PROTONIX) EC tablet 40 mg, 40 mg, Oral, Q AM, Nunu Bowen APRN, 40 mg at 07/22/21 0308  •  PARoxetine (PAXIL) tablet 40 mg, 40 mg, Oral, Daily, Nunu Bowen, APRN  •  sodium chloride 0.9 % flush 10 mL, 10 mL, Intravenous, PRN, Nunu Bowen, APRN  •  sodium chloride 0.9 % flush 10 mL, 10 mL,  Intravenous, Q12H, Nunu Bowen APRN  •  sodium chloride 0.9 % flush 10 mL, 10 mL, Intravenous, PRN, Nunu Bowen APRN  •  sodium chloride 0.9 % infusion, 100 mL/hr, Intravenous, Continuous, Lizeth Limon MD, Last Rate: 100 mL/hr at 07/22/21 0334, 100 mL/hr at 07/22/21 0334  •  sucralfate (CARAFATE) tablet 1 g, 1 g, Oral, 4x Daily, Nunu Bowen APRN  •  [START ON 7/23/2021] vitamin B-12 (CYANOCOBALAMIN) tablet 500 mcg, 500 mcg, Oral, Once per day on Mon Wed Fri, Nunu Bowen APRN    ALLERGIES     Patient has no known allergies.    FAMILY HISTORY       Family History   Problem Relation Age of Onset   • Heart disease Mother 50   • Heart disease Father 60          SOCIAL HISTORY       Social History     Socioeconomic History   • Marital status:      Spouse name: Not on file   • Number of children: Not on file   • Years of education: Not on file   • Highest education level: Not on file   Tobacco Use   • Smoking status: Current Every Day Smoker     Packs/day: 1.00     Types: Cigarettes   Substance and Sexual Activity   • Alcohol use: Yes     Comment: OCC   • Drug use: Never         PHYSICAL EXAM    (up to 7 for level 4, 8 or more for level 5)     Vitals:    07/21/21 2338 07/22/21 0100 07/22/21 0231 07/22/21 0505   BP:  126/60 133/59 118/48   BP Location:    Left arm   Patient Position:    Lying   Pulse: 79 75 62 68   Resp:   18 18   Temp:   98.6 °F (37 °C) 98.6 °F (37 °C)   TempSrc:   Oral Oral   SpO2: 99% 98% 98%    Weight:    79.5 kg (175 lb 4.3 oz)   Height:           Physical Exam  General: Awake, alert, no acute distress.  HEENT: Conjunctivae normal.  Neck: Trachea midline.  Cardiac: Heart regular rate, rhythm, no murmurs, rubs, or gallops  Lungs: Lungs are clear to auscultation, there is no wheezing, rhonchi, or rales. There is no use of accessory muscles.  Chest wall: There is no tenderness to palpation over the chest wall or over ribs  Abdomen: Abdomen is soft, nontender, nondistended.  There are no firm or pulsatile masses, no rebound rigidity or guarding.   Musculoskeletal: No deformity.  Neuro: Alert and oriented x 4.  Dermatology: Skin is warm and dry  Psych: Mentation is grossly normal, cognition is grossly normal. Affect is appropriate.        DIAGNOSTIC RESULTS     EKG: All EKG's are interpreted by the Emergency Department Physician who either signs or Co-signs this chart in the absence of a cardiologist.    ECG 1: Sinus rhythm rate of 81, no acute ST segment or T wave changes, normal intervals, no ectopy    ECG 2: Sinus rhythm rate of 63, no acute ST segment or T wave changes, normal intervals, no ectopy    RADIOLOGY:   Non-plain film images such as CT, Ultrasound and MRI are read by the radiologist. Plain radiographic images are visualized and preliminarily interpreted by the emergency physician with the below findings:      [x] Radiologist's Report Reviewed:  XR Chest 1 View   Final Result   No acute cardiopulmonary findings.      Signer Name: Jaxon Perez MD    Signed: 7/21/2021 11:54 PM    Workstation Name: [facility]     [facility]            LABS:    I have reviewed and interpreted all of the currently available lab results from this visit (if applicable):  Results for orders placed or performed during the hospital encounter of 07/21/21   COVID-19,CEPHEID,ANDRES IN-HOUSE(OR EMERGENT/ADD-ON),NP SWAB IN TRANSPORT MEDIA 3-4 HR TAT - Swab, Nasopharynx    Specimen: Nasopharynx; Swab   Result Value Ref Range    COVID19 Not Detected Not Detected - Ref. Range   Troponin    Specimen: Blood   Result Value Ref Range    Troponin T <0.010 0.000 - 0.030 ng/mL   Troponin    Specimen: Blood   Result Value Ref Range    Troponin T <0.010 0.000 - 0.030 ng/mL   Comprehensive Metabolic Panel    Specimen: Blood   Result Value Ref Range    Glucose 79 65 - 99 mg/dL    BUN 14 6 - 20 mg/dL    Creatinine 0.77 0.57 - 1.00 mg/dL    Sodium 141 136 - 145 mmol/L    Potassium 3.6 3.5 - 5.2  mmol/L    Chloride 104 98 - 107 mmol/L    CO2 23.0 22.0 - 29.0 mmol/L    Calcium 9.3 8.6 - 10.5 mg/dL    Total Protein 7.0 6.0 - 8.5 g/dL    Albumin 4.20 3.50 - 5.20 g/dL    ALT (SGPT) 19 1 - 33 U/L    AST (SGOT) 21 1 - 32 U/L    Alkaline Phosphatase 67 39 - 117 U/L    Total Bilirubin 0.2 0.0 - 1.2 mg/dL    eGFR Non African Amer 79 >60 mL/min/1.73    Globulin 2.8 gm/dL    A/G Ratio 1.5 g/dL    BUN/Creatinine Ratio 18.2 7.0 - 25.0    Anion Gap 14.0 5.0 - 15.0 mmol/L   Lipase    Specimen: Blood   Result Value Ref Range    Lipase 48 13 - 60 U/L   BNP    Specimen: Blood   Result Value Ref Range    proBNP 17.7 0.0 - 900.0 pg/mL   CBC Auto Differential    Specimen: Blood   Result Value Ref Range    WBC 12.34 (H) 3.40 - 10.80 10*3/mm3    RBC 4.44 3.77 - 5.28 10*6/mm3    Hemoglobin 13.2 12.0 - 15.9 g/dL    Hematocrit 41.6 34.0 - 46.6 %    MCV 93.7 79.0 - 97.0 fL    MCH 29.7 26.6 - 33.0 pg    MCHC 31.7 31.5 - 35.7 g/dL    RDW 14.4 12.3 - 15.4 %    RDW-SD 49.2 37.0 - 54.0 fl    MPV 11.0 6.0 - 12.0 fL    Platelets 288 140 - 450 10*3/mm3    Neutrophil % 55.1 42.7 - 76.0 %    Lymphocyte % 32.5 19.6 - 45.3 %    Monocyte % 7.1 5.0 - 12.0 %    Eosinophil % 4.2 0.3 - 6.2 %    Basophil % 0.8 0.0 - 1.5 %    Immature Grans % 0.3 0.0 - 0.5 %    Neutrophils, Absolute 6.79 1.70 - 7.00 10*3/mm3    Lymphocytes, Absolute 4.01 (H) 0.70 - 3.10 10*3/mm3    Monocytes, Absolute 0.88 0.10 - 0.90 10*3/mm3    Eosinophils, Absolute 0.52 (H) 0.00 - 0.40 10*3/mm3    Basophils, Absolute 0.10 0.00 - 0.20 10*3/mm3    Immature Grans, Absolute 0.04 0.00 - 0.05 10*3/mm3    nRBC 0.0 0.0 - 0.2 /100 WBC   Scan Slide    Specimen: Blood   Result Value Ref Range    RBC Morphology Normal Normal    WBC Morphology Normal Normal    Platelet Morphology Normal Normal   Urinalysis With Culture If Indicated - Urine, Clean Catch    Specimen: Urine, Clean Catch   Result Value Ref Range    Color, UA Yellow Yellow, Straw    Appearance, UA Clear Clear    pH, UA 7.0 5.0 - 8.0     Specific Gravity, UA 1.009 1.001 - 1.030    Glucose, UA Negative Negative    Ketones, UA Negative Negative    Bilirubin, UA Negative Negative    Blood, UA Negative Negative    Protein, UA Negative Negative    Leuk Esterase, UA Negative Negative    Nitrite, UA Negative Negative    Urobilinogen, UA 0.2 E.U./dL 0.2 - 1.0 E.U./dL   Magnesium    Specimen: Blood   Result Value Ref Range    Magnesium 1.8 1.6 - 2.6 mg/dL   Procalcitonin    Specimen: Blood   Result Value Ref Range    Procalcitonin <0.02 0.00 - 0.25 ng/mL   Basic Metabolic Panel    Specimen: Blood   Result Value Ref Range    Glucose 103 (H) 65 - 99 mg/dL    BUN 12 6 - 20 mg/dL    Creatinine 0.76 0.57 - 1.00 mg/dL    Sodium 141 136 - 145 mmol/L    Potassium 3.8 3.5 - 5.2 mmol/L    Chloride 108 (H) 98 - 107 mmol/L    CO2 21.0 (L) 22.0 - 29.0 mmol/L    Calcium 8.8 8.6 - 10.5 mg/dL    eGFR Non African Amer 80 >60 mL/min/1.73    BUN/Creatinine Ratio 15.8 7.0 - 25.0    Anion Gap 12.0 5.0 - 15.0 mmol/L   CBC Auto Differential    Specimen: Blood   Result Value Ref Range    WBC 9.69 3.40 - 10.80 10*3/mm3    RBC 4.19 3.77 - 5.28 10*6/mm3    Hemoglobin 12.4 12.0 - 15.9 g/dL    Hematocrit 38.7 34.0 - 46.6 %    MCV 92.4 79.0 - 97.0 fL    MCH 29.6 26.6 - 33.0 pg    MCHC 32.0 31.5 - 35.7 g/dL    RDW 14.3 12.3 - 15.4 %    RDW-SD 48.1 37.0 - 54.0 fl    MPV 11.1 6.0 - 12.0 fL    Platelets 242 140 - 450 10*3/mm3    Neutrophil % 54.1 42.7 - 76.0 %    Lymphocyte % 34.7 19.6 - 45.3 %    Monocyte % 6.1 5.0 - 12.0 %    Eosinophil % 4.1 0.3 - 6.2 %    Basophil % 0.8 0.0 - 1.5 %    Immature Grans % 0.2 0.0 - 0.5 %    Neutrophils, Absolute 5.24 1.70 - 7.00 10*3/mm3    Lymphocytes, Absolute 3.36 (H) 0.70 - 3.10 10*3/mm3    Monocytes, Absolute 0.59 0.10 - 0.90 10*3/mm3    Eosinophils, Absolute 0.40 0.00 - 0.40 10*3/mm3    Basophils, Absolute 0.08 0.00 - 0.20 10*3/mm3    Immature Grans, Absolute 0.02 0.00 - 0.05 10*3/mm3    nRBC 0.0 0.0 - 0.2 /100 WBC   Magnesium    Specimen: Blood    Result Value Ref Range    Magnesium 1.9 1.6 - 2.6 mg/dL   Hemoglobin A1c    Specimen: Blood   Result Value Ref Range    Hemoglobin A1C 5.20 4.80 - 5.60 %   Lipid Panel    Specimen: Blood   Result Value Ref Range    Total Cholesterol 137 0 - 200 mg/dL    Triglycerides 81 0 - 150 mg/dL    HDL Cholesterol 38 (L) 40 - 60 mg/dL    LDL Cholesterol  83 0 - 100 mg/dL    VLDL Cholesterol 16 5 - 40 mg/dL    LDL/HDL Ratio 2.18    TSH    Specimen: Blood   Result Value Ref Range    TSH 1.270 0.270 - 4.200 uIU/mL   Troponin    Specimen: Blood   Result Value Ref Range    Troponin T <0.010 0.000 - 0.030 ng/mL   POCT pregnancy, urine    Specimen: Urine   Result Value Ref Range    HCG, Urine, QL Negative Negative    Lot Number [id number]     Internal Positive Control Passed Positive, Passed    Internal Negative Control Passed Negative, Passed   ECG 12 Lead   Result Value Ref Range    QT Interval 356 ms    QTC Interval 413 ms   ECG 12 Lead   Result Value Ref Range    QT Interval 382 ms    QTC Interval 390 ms   ECG 12 Lead   Result Value Ref Range    QT Interval 368 ms    QTC Interval 402 ms   Green Top (Gel)   Result Value Ref Range    Extra Tube Hold for add-ons.    Lavender Top   Result Value Ref Range    Extra Tube hold for add-on    Gold Top - SST   Result Value Ref Range    Extra Tube Hold for add-ons.    Gray Top   Result Value Ref Range    Extra Tube Hold for add-ons.         All other labs were within normal range or not returned as of this dictation.      EMERGENCY DEPARTMENT COURSE and DIFFERENTIAL DIAGNOSIS/MDM:   Vitals:    Vitals:    07/21/21 2338 07/22/21 0100 07/22/21 0231 07/22/21 0505   BP:  126/60 133/59 118/48   BP Location:    Left arm   Patient Position:    Lying   Pulse: 79 75 62 68   Resp:   18 18   Temp:   98.6 °F (37 °C) 98.6 °F (37 °C)   TempSrc:   Oral Oral   SpO2: 99% 98% 98%    Weight:    79.5 kg (175 lb 4.3 oz)   Height:           ED Course as of Jul 22 0613   Thu Jul 22, 2021   0039 I discussed the case  with Dr. Limon who accepts the patient for admission.    [NS]   0039 This patient presents with substernal chest pain concerning for ACS, pneumonia, pneumothorax, chest wall pain, costochondritis, GERD.  Based on the patient's history and physical examination, I feel that patient is moderate to high risk of cardiac cause of chest pain.  The initial ECG and troponin do not demonstrate any acute abnormality.  Chest x-ray does not demonstrate any evidence of pneumonia, pneumothorax, or other acute thoracic process and laboratory evaluation does not demonstrate acute electrolyte abnormality, significant anemia.  Based on history and physical examination, I do not feel that this presentation represents other emergent cause of chest pain such as cardiac tamponade, esophageal rupture, pulmonary embolism, aortic dissection.  Patient was given aspirin in the emergency department and will be admitted to the hospital service for further evaluation and care.        [NS]      ED Course User Index  [NS] Ruiz Munguia MD           MEDICATIONS ADMINISTERED IN ED:  Medications   sodium chloride 0.9 % flush 10 mL (has no administration in time range)   sodium chloride 0.9 % infusion (100 mL/hr Intravenous Currently Infusing 7/22/21 0334)   aspirin EC tablet 81 mg (has no administration in time range)   atorvastatin (LIPITOR) tablet 40 mg (40 mg Oral Given 7/22/21 0308)   bisoprolol (ZEBeta) tablet 5 mg (has no administration in time range)   lisinopril (PRINIVIL,ZESTRIL) 10 mg, hydroCHLOROthiazide (HYDRODIURIL) 12.5 mg for ZESTORETIC 10-12.5 (has no administration in time range)   multivitamin with minerals 1 tablet (has no administration in time range)   PARoxetine (PAXIL) tablet 40 mg (has no administration in time range)   sucralfate (CARAFATE) tablet 1 g (has no administration in time range)   vitamin B-12 (CYANOCOBALAMIN) tablet 500 mcg (has no administration in time range)   sodium chloride 0.9 % flush 10 mL (has no  administration in time range)   sodium chloride 0.9 % flush 10 mL (has no administration in time range)   acetaminophen (TYLENOL) tablet 650 mg (has no administration in time range)     Or   acetaminophen (TYLENOL) 160 MG/5ML solution 650 mg (has no administration in time range)     Or   acetaminophen (TYLENOL) suppository 650 mg (has no administration in time range)   ondansetron (ZOFRAN) tablet 4 mg (has no administration in time range)     Or   ondansetron (ZOFRAN) injection 4 mg (has no administration in time range)   sennosides-docusate (PERICOLACE) 8.6-50 MG per tablet 2 tablet (has no administration in time range)     And   polyethylene glycol (MIRALAX) packet 17 g (has no administration in time range)     And   bisacodyl (DULCOLAX) EC tablet 5 mg (has no administration in time range)     And   bisacodyl (DULCOLAX) suppository 10 mg (has no administration in time range)   hydrOXYzine (ATARAX) tablet 25 mg (has no administration in time range)   nicotine (NICODERM CQ) 21 MG/24HR patch 1 patch (has no administration in time range)   pantoprazole (PROTONIX) EC tablet 40 mg (40 mg Oral Given 7/22/21 0308)   famotidine (PEPCID) injection 20 mg (20 mg Intravenous Given 7/22/21 0308)   aspirin chewable tablet 324 mg (243 mg Oral Given 7/21/21 2242)   aluminum-magnesium hydroxide-simethicone (MAALOX MAX) 400-400-40 MG/5ML 30 mL, Lidocaine Viscous HCl (XYLOCAINE) 2 % 15 mL suspension ( Oral Given 7/22/21 0330)         FINAL IMPRESSION      1. Unstable angina (CMS/HCC)    2. History of hypertension    3. History of hyperlipidemia    4. Smoker          DISPOSITION/PLAN     ED Disposition     ED Disposition Condition Comment    Decision to Admit  Level of Care: Telemetry [5]   Diagnosis: Angina pectoris (CMS/HCC) [765614]   Admitting Physician: AVE QUINTANA [[id number]]   Attending Physician: AVE QUINTANA [[id number]]              Ruiz Munguia MD  Attending Emergency Physician               Ruiz Munguia,  MD  07/22/21 0613

## 2021-07-23 VITALS
WEIGHT: 175 LBS | TEMPERATURE: 98.6 F | DIASTOLIC BLOOD PRESSURE: 53 MMHG | BODY MASS INDEX: 31.01 KG/M2 | RESPIRATION RATE: 17 BRPM | OXYGEN SATURATION: 96 % | HEIGHT: 63 IN | SYSTOLIC BLOOD PRESSURE: 111 MMHG | HEART RATE: 72 BPM

## 2021-07-23 PROBLEM — I20.9 ANGINA PECTORIS: Status: RESOLVED | Noted: 2021-07-22 | Resolved: 2021-07-23

## 2021-07-23 PROCEDURE — 25010000002 ONDANSETRON PER 1 MG: Performed by: INTERNAL MEDICINE

## 2021-07-23 PROCEDURE — G0378 HOSPITAL OBSERVATION PER HR: HCPCS

## 2021-07-23 PROCEDURE — 99217 PR OBSERVATION CARE DISCHARGE MANAGEMENT: CPT | Performed by: INTERNAL MEDICINE

## 2021-07-23 RX ORDER — PANTOPRAZOLE SODIUM 40 MG/1
40 TABLET, DELAYED RELEASE ORAL DAILY
Qty: 30 TABLET | Refills: 1 | Status: SHIPPED | OUTPATIENT
Start: 2021-07-23 | End: 2021-09-01

## 2021-07-23 RX ADMIN — DOCUSATE SODIUM 50MG AND SENNOSIDES 8.6MG 2 TABLET: 8.6; 5 TABLET, FILM COATED ORAL at 08:45

## 2021-07-23 RX ADMIN — BISOPROLOL FUMARATE 5 MG: 5 TABLET, FILM COATED ORAL at 08:46

## 2021-07-23 RX ADMIN — Medication 500 MCG: at 08:45

## 2021-07-23 RX ADMIN — ONDANSETRON 4 MG: 2 INJECTION INTRAMUSCULAR; INTRAVENOUS at 09:01

## 2021-07-23 RX ADMIN — SUCRALFATE 1 G: 1 TABLET ORAL at 08:46

## 2021-07-23 RX ADMIN — SUCRALFATE 1 G: 1 TABLET ORAL at 13:49

## 2021-07-23 RX ADMIN — MULTIPLE VITAMINS W/ MINERALS TAB 1 TABLET: TAB at 08:45

## 2021-07-23 RX ADMIN — HYDROCHLOROTHIAZIDE: 12.5 CAPSULE ORAL at 08:45

## 2021-07-23 RX ADMIN — SODIUM CHLORIDE, PRESERVATIVE FREE 10 ML: 5 INJECTION INTRAVENOUS at 08:46

## 2021-07-23 RX ADMIN — PAROXETINE HYDROCHLORIDE 40 MG: 20 TABLET, FILM COATED ORAL at 08:45

## 2021-07-23 RX ADMIN — PANTOPRAZOLE SODIUM 40 MG: 40 TABLET, DELAYED RELEASE ORAL at 05:35

## 2021-07-23 RX ADMIN — ASPIRIN 81 MG: 81 TABLET, COATED ORAL at 08:45

## 2021-07-23 NOTE — PLAN OF CARE
Goal Outcome Evaluation:    VSS. TR band deflated and removed. TR band site assessed and is clean, dry and intact. Gauze and clear dressing placed over heart cath site. Will continue to monitor.       Problem: Adult Inpatient Plan of Care  Goal: Plan of Care Review  Outcome: Ongoing, Progressing  Goal: Patient-Specific Goal (Individualized)  Outcome: Ongoing, Progressing  Goal: Absence of Hospital-Acquired Illness or Injury  Outcome: Ongoing, Progressing  Intervention: Identify and Manage Fall Risk  Recent Flowsheet Documentation  Taken 7/23/2021 0200 by Gamal James RN  Safety Promotion/Fall Prevention:   activity supervised   assistive device/personal items within reach   clutter free environment maintained   safety round/check completed   room organization consistent  Taken 7/23/2021 0000 by Gamal James RN  Safety Promotion/Fall Prevention:   activity supervised   assistive device/personal items within reach   clutter free environment maintained   safety round/check completed   room organization consistent  Taken 7/22/2021 2200 by Gamal James RN  Safety Promotion/Fall Prevention:   activity supervised   assistive device/personal items within reach   clutter free environment maintained   safety round/check completed   room organization consistent  Taken 7/22/2021 2000 by Gamal James RN  Safety Promotion/Fall Prevention:   activity supervised   assistive device/personal items within reach   clutter free environment maintained   safety round/check completed   room organization consistent  Intervention: Prevent Skin Injury  Recent Flowsheet Documentation  Taken 7/23/2021 0200 by Gamal James RN  Body Position: position changed independently  Skin Protection: adhesive use limited  Taken 7/23/2021 0000 by Gamal James RN  Body Position: position changed independently  Skin Protection: adhesive use limited  Taken 7/22/2021 2200 by Gamal James RN  Body Position: position changed  independently  Skin Protection: adhesive use limited  Taken 7/22/2021 2000 by Gamal James RN  Body Position: position changed independently  Skin Protection: adhesive use limited  Intervention: Prevent and Manage VTE (venous thromboembolism) Risk  Recent Flowsheet Documentation  Taken 7/22/2021 2000 by Gamal James RN  VTE Prevention/Management: bleeding risk factor(s) identified  Goal: Optimal Comfort and Wellbeing  Outcome: Ongoing, Progressing  Intervention: Provide Person-Centered Care  Recent Flowsheet Documentation  Taken 7/22/2021 2000 by Gamal James RN  Trust Relationship/Rapport: care explained  Goal: Readiness for Transition of Care  Outcome: Ongoing, Progressing     Problem: COPD Comorbidity  Goal: Maintenance of COPD Symptom Control  Outcome: Ongoing, Progressing     Problem: Hypertension Comorbidity  Goal: Blood Pressure in Desired Range  Outcome: Ongoing, Progressing

## 2021-07-23 NOTE — PLAN OF CARE
Goal Outcome Evaluation:   DC teaching provided including information new medication. Awaiting transport.

## 2021-07-23 NOTE — PROGRESS NOTES
Stable for discharge from cardiac standpoint, follow-up with me in 3 months.    Smoking cessation and medical therapy recommended.

## 2021-07-23 NOTE — DISCHARGE SUMMARY
[facility] Medicine Services  DISCHARGE SUMMARY    Patient Name: Sarai Machuca  : 1970  MRN: [id number]    Date of Admission: 2021 11:01 PM  Date of Discharge:  21  Primary Care Physician: Adriana Rodriguez MD    Consults     Date and Time Order Name Status Description    2021  2:46 AM Inpatient Cardiology Consult Completed           Hospital Course     Presenting Problem:   Angina pectoris (CMS/HCC) [I20.9]    Active Hospital Problems    Diagnosis  POA   • Essential hypertension [I10]  Yes   • HLD (hyperlipidemia) [E78.5]  Yes   • Anxiety [F41.9]  Yes   • Tobacco use [Z72.0]  Yes   • Coronary artery disease of native artery of native heart with stable angina pectoris (CMS/HCC) [I25.118]  Unknown      Resolved Hospital Problems    Diagnosis Date Resolved POA   • Angina pectoris (CMS/HCC) [I20.9] 2021 Yes          Hospital Course:  Sarai Machuca is a 51 y.o. female with a medical history significant of for HTN, HLD, anxiety who presents to [facility] ED for evaluation of chest pain in the center of her chest that radiates outward bilaterally and extends to the left breast. Pain present since May but is getting worse. Pain is progressive when she works and improves with rest. Pain in epigastric area and taking 600 mg ibuprofen three times daily. Patient was admitted and troponin trended which were negative. Cardiology consulted and patient had a LHC  with non-obs disease and recommended risk factor modification and tobacco cessation. Reviewed tobacco cessation, dietary changes. Recommend continuing to follow up with PCP for lumbar xray and consideration of outpatient physical therapy.       Discharge Follow Up Recommendations for outpatient labs/diagnostics:   PCP Dr. Rodriguez 1 week  Cardiology Dr. Gandara 3 months     Day of Discharge     HPI:   No acute events. Some chest back/chest discomfort this morning. Reviewed LHC and possible MSK pain and gastritis.  Reviewed lifestyle modifications. Close follow up with PCP and consideration of outpatient PT. Answered all questions to the best of my ability. Agreeable for discharge.     Review of Systems  Gen- No fevers, chills  CV- + chest pain, palpitations  Resp- No cough, dyspnea  GI- No N/V/D, abd pain    Vital Signs:   Temp:  [98.4 °F (36.9 °C)-99.2 °F (37.3 °C)] 98.6 °F (37 °C)  Heart Rate:  [64-82] 72  Resp:  [14-17] 17  BP: (103-125)/(50-70) 111/53     Physical Exam:  Constitutional: No acute distress, awake, alert  HENT: NCAT, mucous membranes moist  Respiratory: Clear to auscultation bilaterally, respiratory effort normal   Cardiovascular: RRR, no murmurs, rubs, or gallops  Gastrointestinal: Positive bowel sounds, soft, nontender, nondistended  Musculoskeletal: No bilateral ankle edema  Psychiatric: Appropriate affect, cooperative  Neurologic: Oriented x 3, strength symmetric in all extremities, Cranial Nerves grossly intact to confrontation, speech clear  Skin: No rashes    Pertinent  and/or Most Recent Results     LAB RESULTS:      Lab 07/22/21  0509 07/22/21  0117 07/21/21  2243   WBC 9.69  --  12.34*   HEMOGLOBIN 12.4  --  13.2   HEMATOCRIT 38.7  --  41.6   PLATELETS 242  --  288   NEUTROS ABS 5.24  --  6.79   IMMATURE GRANS (ABS) 0.02  --  0.04   LYMPHS ABS 3.36*  --  4.01*   MONOS ABS 0.59  --  0.88   EOS ABS 0.40  --  0.52*   MCV 92.4  --  93.7   PROCALCITONIN  --  <0.02  --    D DIMER QUANT 0.52  --   --          Lab 07/22/21  0509 07/22/21  0117 07/21/21  2242   SODIUM 141  --  141   POTASSIUM 3.8  --  3.6   CHLORIDE 108*  --  104   CO2 21.0*  --  23.0   ANION GAP 12.0  --  14.0   BUN 12  --  14   CREATININE 0.76  --  0.77   GLUCOSE 103*  --  79   CALCIUM 8.8  --  9.3   MAGNESIUM 1.9 1.8  --    HEMOGLOBIN A1C 5.20  --   --    TSH 1.270  --   --          Lab 07/21/21  2242   TOTAL PROTEIN 7.0   ALBUMIN 4.20   GLOBULIN 2.8   ALT (SGPT) 19   AST (SGOT) 21   BILIRUBIN 0.2   ALK PHOS 67   LIPASE 48         Lab  07/22/21  0509 07/22/21 0117 07/21/21  2242   PROBNP  --   --  17.7   TROPONIN T <0.010 <0.010 <0.010         Lab 07/22/21  0509   CHOLESTEROL 137   LDL CHOL 83   HDL CHOL 38*   TRIGLYCERIDES 81             Brief Urine Lab Results  (Last result in the past 365 days)      Color   Clarity   Blood   Leuk Est   Nitrite   Protein   CREAT   Urine HCG        07/22/21 0117 Yellow Clear Negative Negative Negative Negative             Microbiology Results (last 10 days)     Procedure Component Value - Date/Time    COVID PRE-OP / PRE-PROCEDURE SCREENING ORDER (NO ISOLATION) - Swab, Nasopharynx [[id number]]  (Normal) Collected: 07/22/21 0117    Lab Status: Final result Specimen: Swab from Nasopharynx Updated: 07/22/21 0250    Narrative:      The following orders were created for panel order COVID PRE-OP / PRE-PROCEDURE SCREENING ORDER (NO ISOLATION) - Swab, Nasopharynx.  Procedure                               Abnormality         Status                     ---------                               -----------         ------                     COVID-19,CEPHEID,ANDRES IN-...[[id number]]  Normal              Final result                 Please view results for these tests on the individual orders.    COVID-19,CEPHEID,ANDRES IN-HOUSE(OR EMERGENT/ADD-ON),NP SWAB IN TRANSPORT MEDIA 3-4 HR TAT - Swab, Nasopharynx [[id number]]  (Normal) Collected: 07/22/21 0117    Lab Status: Final result Specimen: Swab from Nasopharynx Updated: 07/22/21 0250     COVID19 Not Detected    Narrative:      Fact sheet for providers: https://www.fda.gov/media/774902/download     Fact sheet for patients: https://www.fda.gov/media/833381/download  Fact sheet for providers: https://www.fda.gov/media/375227/download     Fact sheet for patients: https://www.fda.gov/media/395760/download          Adult Transthoracic Echo Complete With Contrast if Necessary Per Protocol    Result Date: 7/22/2021  · Left ventricular ejection fraction appears to be 61 - 65%. Left ventricular  systolic function is normal. · Left ventricular diastolic function was normal. · Estimated right ventricular systolic pressure from tricuspid regurgitation is normal (<35 mmHg). Calculated right ventricular systolic pressure from tricuspid regurgitation is 17 mmHg. · No significant structural or functional valvular disease.      Cardiac Catheterization/Vascular Study    Result Date: 7/22/2021   [facility] [address], [address] [place], [place], [address] [phone]  [email or web address]   CARDIAC CATHETERIZATION PROCEDURE NOTE     · Mid RCA, 3 tandem 50 to 60% lesions by OCT imaging. · Normal LAD and left circumflex · LVEDP 10-15 mmHg RECOMMENDATIONS: · Continue medical therapy and risk factor modification including smoking cessation. · Follow-up with cardiology in 6-8 weeks. · Follow-up with PCP regarding noncardiac chest pain work-up --------------------------------------------------------------------------- ------------------------------------------- Indication(s) for this Procedure:  Symptoms started for unstable angina with inconclusive stress test prior to admission. Procedure(s) Performed: 1.  Right radial artery access . 2. Selective coronary angiography 3. Left heart catheterization.   4.  OCT imaging of the RCA Description of the Procedure:   Informed consent was obtained with the goals, rationale, alternatives, risks and benefits of the procedure explained to the patient.  A 6Fr Terumo slender sheath was placed in the right radial artery. Selective angiography of the right coronary artery was performed with a 5Fr JR4 diagnostic catheter.  Selective angiography of the left coronary arteries was performed with a 5Fr JL3.5 diagnostic catheter.  Left heart catheterization with left ventriculography was performed with a 5Fr pigtail catheter placed in the left ventricle.  The procedure was completed and the sheath was removed. A radial patent hemostasis band was placed  for hemostasis. Angiography revealed hazy mid RCA stenosis therefore OCT imaging was carried out to further investigate.  Therapeutic heparin was given, JR4 with sideholes guide was used to engage the RCA, Sc[place] blue interventional wire was placed in the distal PDA, the dragonfly OCT catheter was advanced in the distal RCA.  OCT imaging revealed at maximum 63% stenosis in the mid RCA with tandem 50 to 60% stenoses.  Smooth fibrous atheroma noted, no evidence of dissection, thrombus, or calcium noted.  MLA 3.8 mm². Angiographic Findings: Right coronary dominant circulation · Left main artery:   Large-caliber vessel bifurcates into an LAD and left circumflex, normal. · Left anterior descending artery: The caliber vessel gives rise to an equal size large diagonal, normal. · Left circumflex artery: Large-caliber nondominant vessel gives rise to 2 OM branches, normal. · Right coronary artery: Large-caliber dominant vessel gives rise to PDA to PLV, 3 tandem 50 to 60% stenoses in the mid otherwise normal. Hemodynamic Findings: · Ao pressure: 113/67 mmHg · LVEDP: 10-15 mmHg · LV to Ao pullback peak to peak gradient: 0 mmHg Estimated Blood Loss: Minimal Specimen(s): None obtained Sheath: Removed, radial patent hemostasis band was placed for hemostasis. Complications: There were no apparent early complications. Renny Gandara MD, [facility] Interventional Cardiology [place] [facility]     XR Chest 1 View    Result Date: 7/21/2021  CR Chest 1 Vw INDICATION: Chest pain for 2 months, worse today. COMPARISON:  None available. FINDINGS: Single portable AP view(s) of the chest.  The heart and mediastinal contours are normal. The lungs are clear. No pneumothorax or pleural effusion.     No acute cardiopulmonary findings. Signer Name: Jaxon Perez MD  Signed: 7/21/2021 11:54 PM  Workstation Name: [facility]  [facility]              Results for orders placed during the hospital encounter  of 07/21/21    Adult Transthoracic Echo Complete With Contrast if Necessary Per Protocol    Interpretation Summary  · Left ventricular ejection fraction appears to be 61 - 65%. Left ventricular systolic function is normal.  · Left ventricular diastolic function was normal.  · Estimated right ventricular systolic pressure from tricuspid regurgitation is normal (<35 mmHg). Calculated right ventricular systolic pressure from tricuspid regurgitation is 17 mmHg.  · No significant structural or functional valvular disease.      Plan for Follow-up of Pending Labs/Results:     Discharge Details        Discharge Medications      New Medications      Instructions Start Date   pantoprazole 40 MG EC tablet  Commonly known as: PROTONIX   40 mg, Oral, Daily         Continue These Medications      Instructions Start Date   aspirin 81 MG EC tablet   81 mg, Oral, Daily      atorvastatin 40 MG tablet  Commonly known as: LIPITOR   40 mg, Oral, Daily      bisoprolol 5 MG tablet  Commonly known as: ZEBeta   5 mg, Oral, Daily      lisinopril-hydrochlorothiazide 10-12.5 MG per tablet  Commonly known as: PRINZIDE,ZESTORETIC   1 tablet, Oral, Daily      multivitamin with minerals tablet tablet   1 tablet, Oral, Daily      nitroglycerin 0.4 MG SL tablet  Commonly known as: NITROSTAT   0.4 mg, Sublingual, Every 5 Minutes PRN, Take no more than 3 doses in 15 minutes.       PARoxetine 40 MG tablet  Commonly known as: PAXIL   40 mg, Oral, Every Morning      SLOW FE PO   Oral      sucralfate 1 g tablet  Commonly known as: CARAFATE   1 g, Oral, 4 Times Daily      vitamin B-12 500 MCG tablet  Commonly known as: CYANOCOBALAMIN   500 mcg, Oral, 3 Times Weekly      Vitamin D3 1.25 MG (86260 UT) capsule   Oral, Every 7 Days             No Known Allergies      Discharge Disposition:  Home or Self Care    Diet:  Hospital:  Diet Order   Procedures   • Diet Regular; Cardiac       Activity:  Activity Instructions     Activity as Tolerated             Restrictions or Other Recommendations:         CODE STATUS:    Code Status and Medical Interventions:   Ordered at: 07/22/21 0137     Code Status:    CPR     Medical Interventions (Level of Support Prior to Arrest):    Full       Future Appointments   Date Time Provider Department Center   7/30/2021  2:45 PM Abdoul Sue APRN [facility]   10/29/2021 11:30 AM Renny Gandara MD [facility]       Additional Instructions for the Follow-ups that You Need to Schedule     Discharge Follow-up with PCP   As directed       Currently Documented PCP:    Adriana Rodriguez MD    PCP Phone Number:    [phone]     Follow Up Details: PCP Dr. Rodriguez 1 week         Discharge Follow-up with Specialty: Renny Gandara MD, [facility] [phone]; 3 Months   As directed      Specialty: Renny Gandara MD, [facility] [phone]    Follow Up: 3 Months    Follow Up Details: Hospital  for cad                     Jenise KumarDO  07/23/21      Time Spent on Discharge:  I spent  35  minutes on this discharge activity which included: face-to-face encounter with the patient, reviewing the data in the system, coordination of the care with the nursing staff as well as consultants, documentation, and entering orders.

## 2021-07-25 LAB
QT INTERVAL: 368 MS
QT INTERVAL: 394 MS
QTC INTERVAL: 402 MS
QTC INTERVAL: 403 MS

## 2021-07-30 ENCOUNTER — TELEPHONE (OUTPATIENT)
Dept: CARDIOLOGY | Facility: HOSPITAL | Age: 51
End: 2021-07-30

## 2021-07-30 ENCOUNTER — OFFICE VISIT (OUTPATIENT)
Dept: CARDIOLOGY | Facility: HOSPITAL | Age: 51
End: 2021-07-30

## 2021-07-30 VITALS
BODY MASS INDEX: 29.46 KG/M2 | SYSTOLIC BLOOD PRESSURE: 138 MMHG | OXYGEN SATURATION: 98 % | RESPIRATION RATE: 16 BRPM | DIASTOLIC BLOOD PRESSURE: 66 MMHG | WEIGHT: 166.25 LBS | TEMPERATURE: 97.9 F | HEIGHT: 63 IN | HEART RATE: 77 BPM

## 2021-07-30 DIAGNOSIS — Z72.0 TOBACCO USE: ICD-10-CM

## 2021-07-30 DIAGNOSIS — I25.810 CORONARY ARTERY DISEASE INVOLVING CORONARY BYPASS GRAFT OF NATIVE HEART WITHOUT ANGINA PECTORIS: Primary | ICD-10-CM

## 2021-07-30 DIAGNOSIS — I10 ESSENTIAL HYPERTENSION: ICD-10-CM

## 2021-07-30 DIAGNOSIS — R06.02 SHORTNESS OF BREATH: ICD-10-CM

## 2021-07-30 PROCEDURE — 99214 OFFICE O/P EST MOD 30 MIN: CPT | Performed by: NURSE PRACTITIONER

## 2021-07-30 RX ORDER — ISOSORBIDE MONONITRATE 30 MG/1
30 TABLET, EXTENDED RELEASE ORAL DAILY
Qty: 30 TABLET | Refills: 1 | Status: SHIPPED | OUTPATIENT
Start: 2021-07-30 | End: 2021-09-01

## 2021-07-30 NOTE — PROGRESS NOTES
"[facility] Heart and Vascular    Chief Complaint  Establish Care (CAD hosp fu) and Chest Pain    Subjective    History of Present Illness {  Problem List  Visit  Diagnosis   Encounters  Notes  Medications  Labs  Result Review Imaging  Media :23}     Sarai Machuca presents to [facility] CARDIOLOGY for   History of Present Illness     41-year-old female admitted to [facility] on 7/21/2021 with chest pain.  History of hypertension, hyperlipidemia, anxiety, tobacco use.  Left heart catheterization completed on 7/22/2022 with nonobstructive CAD.    Patient continues to have midsternal chest discomfort.  States it is relieved with nitroglycerin.  Denies dizziness, near syncope, syncope.  Intermittent dyspnea with activities.  Continues to smoke.  Denies feelings of palpitations.    History of PFTs 2 years ago.  Has not used inhaler for over a year.      Objective     Vital Signs:   Vitals:    07/30/21 1433 07/30/21 1435 07/30/21 1436   BP: 137/62 137/67 138/66   BP Location: Right arm Left arm Left arm   Patient Position: Sitting Standing Sitting   Cuff Size: Adult Adult Adult   Pulse: 73 82 77   Resp:   16   Temp:  97.9 °F (36.6 °C) 97.9 °F (36.6 °C)   TempSrc:  Temporal Temporal   SpO2: 97% 99% 98%   Weight:   75.4 kg (166 lb 4 oz)   Height:   160 cm (63\")     Body mass index is 29.45 kg/m².  Physical Exam  Vitals reviewed.   Constitutional:       General: She is not in acute distress.     Appearance: Normal appearance.   Cardiovascular:      Rate and Rhythm: Normal rate and regular rhythm.      Pulses:           Radial pulses are 2+ on the right side.        Dorsalis pedis pulses are 2+ on the right side.        Posterior tibial pulses are 2+ on the right side.      Heart sounds: Normal heart sounds.   Pulmonary:      Effort: Pulmonary effort is normal.      Breath sounds: Normal breath sounds.   Abdominal:      Palpations: Abdomen is soft.      " Tenderness: There is no abdominal tenderness.   Musculoskeletal:      Right lower leg: No edema.      Left lower leg: No edema.   Skin:     General: Skin is warm and dry.   Neurological:      Mental Status: She is alert.   Psychiatric:         Mood and Affect: Mood normal.         Behavior: Behavior is cooperative.              Result Review  Data Reviewed:{ Labs  Result Review  Imaging  Med Tab  Media :23}     Lab Results   Component Value Date    WBC 9.69 07/22/2021    HGB 12.4 07/22/2021    HCT 38.7 07/22/2021    MCV 92.4 07/22/2021     07/22/2021     Lab Results   Component Value Date    GLUCOSE 103 (H) 07/22/2021    CALCIUM 8.8 07/22/2021     07/22/2021    K 3.8 07/22/2021    CO2 21.0 (L) 07/22/2021     (H) 07/22/2021    BUN 12 07/22/2021    CREATININE 0.76 07/22/2021    EGFRIFNONA 80 07/22/2021    BCR 15.8 07/22/2021    ANIONGAP 12.0 07/22/2021     Lab Results   Component Value Date    TSH 1.270 07/22/2021     Lab Results   Component Value Date    CHOL 137 07/22/2021     Lab Results   Component Value Date    TRIG 81 07/22/2021     Lab Results   Component Value Date    HDL 38 (L) 07/22/2021     Lab Results   Component Value Date    LDL 83 07/22/2021       Assessment and Plan {CC Problem List  Visit Diagnosis  ROS  Review (Popup)  Health Maintenance  Quality  BestPractice  Medications  SmartSets  SnapShot Encounters  Media :23}   1. Coronary artery disease involving coronary bypass graft of native heart without angina pectoris  Aspirin, statin    Initiate:  - isosorbide mononitrate (IMDUR) 30 MG 24 hr tablet; Take 1 tablet by mouth Daily.  Dispense: 30 tablet; Refill: 1      Patient reports completing a 2-week heart monitor with Dr. Mahajan.  We will request results for review    2. Essential hypertension  Keep home blood pressure log.  Patient currently on lisinopril HCTZ, bisoprolol    3. Tobacco use  Discussed smoking cessation.  Discussed the importance of cessation.  Avoid  vaping.  Discussed cessation aids.  She has tried Chantix as well as nicotine replacement in the past.  She would like to consider her options, develop a plan, and follow-up with primary care provider.  Currently smokes about 10 cigarettes/day    4.  Dyspnea  With history of tobacco use encouraged repeat PFTs.  Patient will discuss with primary care provider.    Echo was normal.              Follow Up {Instructions Charge Capture  Follow-up Communications :23}   Return in about 4 weeks (around 8/27/2021) for Video visit, HTN, Chest pain.    Patient was given instructions and counseling regarding her condition or for health maintenance advice. Please see specific information pulled into the AVS if appropriate.  Patient was instructed to call the Heart and Valve Center with any questions, concerns, or worsening symptoms.    *Please note that portions of this note were completed with a voice recognition program. Efforts were made to edit the dictations, but occasionally words are mistranscribed.

## 2021-08-19 PROBLEM — R00.2 PALPITATIONS: Status: ACTIVE | Noted: 2021-08-19

## 2021-09-01 ENCOUNTER — TELEMEDICINE (OUTPATIENT)
Dept: CARDIOLOGY | Facility: HOSPITAL | Age: 51
End: 2021-09-01

## 2021-09-01 VITALS
BODY MASS INDEX: 29.77 KG/M2 | WEIGHT: 168 LBS | DIASTOLIC BLOOD PRESSURE: 69 MMHG | SYSTOLIC BLOOD PRESSURE: 99 MMHG | HEART RATE: 78 BPM | HEIGHT: 63 IN

## 2021-09-01 DIAGNOSIS — I25.119 CORONARY ARTERY DISEASE INVOLVING NATIVE CORONARY ARTERY OF NATIVE HEART WITH ANGINA PECTORIS (HCC): Primary | ICD-10-CM

## 2021-09-01 DIAGNOSIS — I10 ESSENTIAL HYPERTENSION: ICD-10-CM

## 2021-09-01 DIAGNOSIS — Z72.0 TOBACCO USE: ICD-10-CM

## 2021-09-01 DIAGNOSIS — R06.02 SHORTNESS OF BREATH: ICD-10-CM

## 2021-09-01 PROCEDURE — 99214 OFFICE O/P EST MOD 30 MIN: CPT | Performed by: NURSE PRACTITIONER

## 2021-09-01 RX ORDER — LISINOPRIL AND HYDROCHLOROTHIAZIDE 12.5; 1 MG/1; MG/1
1 TABLET ORAL DAILY
Qty: 30 TABLET | Refills: 3 | Status: SHIPPED | OUTPATIENT
Start: 2021-09-01 | End: 2022-01-20

## 2021-09-01 RX ORDER — BUSPIRONE HYDROCHLORIDE 10 MG/1
1 TABLET ORAL 2 TIMES DAILY
COMMUNITY
Start: 2021-08-30

## 2021-09-01 NOTE — PROGRESS NOTES
"[facility] Heart and Vascular    This was an audio and video enabled telemedicine encounter.    Chief Complaint  Chest Pain and Follow-up    Subjective    History of Present Illness {CC  Problem List  Visit  Diagnosis   Encounters  Notes  Medications  Labs  Result Review Imaging  Media :23}     Sarai Machuca presents to [facility] CARDIOLOGY for   History of Present Illness     41-year-old female recently hospitalized on 7/21/2021 with chest pain.  History of hypertension, hyperlipidemia, anxiety, tobacco use.  Left heart catheterization completed on 7/22/2022 with nonobstructive CAD.      Last follow-up patient continued to have chest pain and discomfort.  Relieved with nitroglycerin.  Patient initiated on isosorbide mononitrate.    Dyspnea with tobacco use.  Discussed seeing primary care provider for possible PFTs.  She has not used inhalers for over a year.  Echo normal.  Patient with tobacco use.  Smokes 10 cigarettes/day.  Tried Chantix in the past as well as nicotine replacement, unsuccessful.      Blood pressure management currently on lisinopril/HCTZ, bisoprolol, recent addition of Imdur.    Patient reported that her PCP increased lisinopril/hydrochlorothiazide to twice a day dosing along with recent addition of Imdur.  Patient systolic blood pressures were in the 70s and 80s.  Patient reported increased dizziness, lightheadedness, weakness.    After 2 weeks patient stopped Imdur.  Systolic blood pressures continue to be in the 80s to 90s with dizziness.    Patient denies chest pain, pressure.  Reports occasional palpitations, but mild.  No near syncope, syncope, edema.  Patient has not used sublingual nitroglycerin.              Objective     Vital Signs:   Vitals:    09/01/21 1305   BP: 99/69   BP Location: Left arm   Patient Position: Sitting   Cuff Size: Adult   Pulse: 78   Weight: 76.2 kg (168 lb)   Height: 160 cm (63\")     Body mass " index is 29.76 kg/m².  Physical Exam  Vitals reviewed.   Constitutional:       General: She is not in acute distress.  Pulmonary:      Effort: Pulmonary effort is normal.   Skin:     Coloration: Skin is not pale.   Neurological:      Mental Status: She is alert.   Psychiatric:         Mood and Affect: Mood normal.         Behavior: Behavior normal. Behavior is cooperative.              Result Review  Data Reviewed:{ Labs  Result Review  Imaging  Med Tab  Media :23}     Assessment and Plan {CC Problem List  Visit Diagnosis  ROS  Review (Popup)  Health Maintenance  Quality  BestPractice  Medications  SmartSets  SnapShot Encounters  Media :23}   1. Coronary artery disease involving native coronary artery of native heart with angina pectoris (CMS/[facility])  Continue aspirin, statin    Stop Imdur    Patient has as needed nitroglycerin as needed    No further chest discomfort even off Imdur    2. Essential hypertension  With recent hypotension due to medication.  Blood pressure has been low.    Patient to continue bisoprolol.  Decrease lisinopril/hydrochlorothiazide to daily dosing.    Continue home blood pressure log.    Reviewed the signs and symptoms of hypotension    3. Shortness of breath  Dyspnea at baseline.  She had a normal echocardiogram    4. Tobacco use  Down to 10 cigarettes/day.  Patient at this time does not have a plan for continued cessation efforts.  Reviewed medication options for cessation, plan for weaning, importance of tobacco cessation.  Patient states understanding, not interested in cessation aids at this time.        Patient will follow up with cardiology as scheduled.  Keep home blood pressure log.  Follow-up with primary care provider routinely.  Follow-up in the heart valve center as needed or as determined by cardiology.      I spent 22 minutes caring for Sarai on this date of service. This time includes time spent by me in the following activities:preparing for the visit,  reviewing tests, performing a medically appropriate examination and/or evaluation , counseling and educating the patient/family/caregiver, ordering medications, tests, or procedures and documenting information in the medical record    Follow Up {Instructions Charge Capture  Follow-up Communications :23}   Return if symptoms worsen or fail to improve.    Patient was given instructions and counseling regarding her condition or for health maintenance advice. Please see specific information pulled into the AVS if appropriate.  Patient was instructed to call the Heart and Valve Center with any questions, concerns, or worsening symptoms.    *Please note that portions of this note were completed with a voice recognition program. Efforts were made to edit the dictations, but occasionally words are mistranscribed.

## 2022-01-20 ENCOUNTER — OFFICE VISIT (OUTPATIENT)
Dept: CARDIOLOGY | Facility: HOSPITAL | Age: 52
End: 2022-01-20

## 2022-01-20 ENCOUNTER — TELEPHONE (OUTPATIENT)
Dept: CARDIOLOGY | Facility: HOSPITAL | Age: 52
End: 2022-01-20

## 2022-01-20 VITALS
DIASTOLIC BLOOD PRESSURE: 77 MMHG | OXYGEN SATURATION: 100 % | HEIGHT: 63 IN | WEIGHT: 164.25 LBS | HEART RATE: 77 BPM | BODY MASS INDEX: 29.1 KG/M2 | TEMPERATURE: 97.4 F | SYSTOLIC BLOOD PRESSURE: 150 MMHG | RESPIRATION RATE: 16 BRPM

## 2022-01-20 DIAGNOSIS — R06.02 SHORTNESS OF BREATH: Primary | ICD-10-CM

## 2022-01-20 DIAGNOSIS — I25.10 CORONARY ARTERY DISEASE INVOLVING NATIVE CORONARY ARTERY OF NATIVE HEART WITHOUT ANGINA PECTORIS: Primary | ICD-10-CM

## 2022-01-20 DIAGNOSIS — R06.02 SHORTNESS OF BREATH: ICD-10-CM

## 2022-01-20 DIAGNOSIS — I10 ESSENTIAL HYPERTENSION: ICD-10-CM

## 2022-01-20 DIAGNOSIS — Z72.0 TOBACCO USE: ICD-10-CM

## 2022-01-20 PROCEDURE — 99214 OFFICE O/P EST MOD 30 MIN: CPT | Performed by: NURSE PRACTITIONER

## 2022-01-20 RX ORDER — LOSARTAN POTASSIUM 25 MG/1
25 TABLET ORAL DAILY
Qty: 30 TABLET | Refills: 3 | Status: SHIPPED | OUTPATIENT
Start: 2022-01-20

## 2022-01-20 RX ORDER — ATENOLOL 50 MG/1
75 TABLET ORAL DAILY
COMMUNITY
Start: 2021-10-26

## 2022-01-20 NOTE — PROGRESS NOTES
"[facility]    Chief Complaint  Coronary Artery Disease and Follow-up    Subjective    History of Present Illness {  Problem List  Visit  Diagnosis   Encounters  Notes  Medications  Labs  Result Review Imaging  Media :23}     Sarai Machuca presents to [facility] CARDIOLOGY for   History of Present Illness     51-year-old female with history of hypertension, hyperlipidemia, anxiety, tobacco use, CAD (nonobstructive).  Left heart catheterization 7/22/2021 with nonobstructive CAD.    Patient had continued with chest pain and discomfort relieved with nitroglycerin.  With initiation of isosorbide mononitrate.  Patient also had increased dosing of blood pressure medications.  She began to have hypotension.  Patient then stopped Imdur.    Last telehealth visit patient's blood pressures was in the 80s.  We decreased her lisinopril hydrochlorothiazide.  Hold last office visit patient had stopped her lisinopril hydrochlorothiazide due to a cough as well as urinary incontinence.  At some point she transitioned back from bisoprolol to atenolol.    Patient with dyspnea and tobacco use.  Echo is normal.  Has tried Chantix in the past as well as nicotine replacement, unsuccessful.  She is back up to smoking 1 pack/day.  No recent PFTs.    Denies chest pain, pressure, dizziness, near syncope, syncope, edema.  Baseline dyspnea on exertion (mild to moderate), occasional palpitations well controlled on her beta-blocker.  Has not used nitroglycerin sublingual since last office visit.      Objective     Vital Signs:   Vitals:    01/20/22 1342   BP: 150/77   BP Location: Left arm   Patient Position: Sitting   Cuff Size: Adult   Pulse: 77   Resp: 16   Temp: 97.4 °F (36.3 °C)   TempSrc: Temporal   SpO2: 100%   Weight: 74.5 kg (164 lb 4 oz)   Height: 160 cm (63\")     Body mass index is 29.1 kg/m².  Physical Exam  Vitals reviewed.   Constitutional:       " General: She is not in acute distress.     Appearance: Normal appearance.   Cardiovascular:      Rate and Rhythm: Normal rate and regular rhythm.      Pulses:           Radial pulses are 2+ on the right side.        Dorsalis pedis pulses are 2+ on the right side.        Posterior tibial pulses are 2+ on the right side.      Heart sounds: Normal heart sounds.   Pulmonary:      Effort: Pulmonary effort is normal.      Breath sounds: Normal breath sounds.   Musculoskeletal:      Right lower leg: No edema.      Left lower leg: No edema.   Skin:     General: Skin is warm and dry.   Neurological:      Mental Status: She is alert.   Psychiatric:         Mood and Affect: Mood normal.         Behavior: Behavior is cooperative.              Result Review  Data Reviewed:{ Labs  Result Review  Imaging  Med Tab  Media :23}     Assessment and Plan {CC Problem List  Visit Diagnosis  ROS  Review (Popup)  Health Maintenance  Quality  BestPractice  Medications  SmartSets  SnapShot Encounters  Media :23}   1. Coronary artery disease involving native coronary artery of native heart without angina pectoris  Statin, aspirin    Has not used nitroglycerin since last office visit.  2. Essential hypertension  Beta-blocker.  Initiate losartan  - losartan (Cozaar) 25 MG tablet; Take 1 tablet by mouth Daily.  Dispense: 30 tablet; Refill: 3    Keep home blood pressure log  3. Tobacco use  Patient is back up to a pack per day.  Has failed cessation aids in the past.  Not interested in cessation aids at this time.  Patient is aware of the importance of cessation.  Currently does not have a plan for quitting.  - Full Pulmonary Function Test With Bronchodilator; Future    4. Shortness of breath  Echocardiogram 7/21/2021 with normal EF no significant valvular disease  Nonobstructive CAD per left heart catheterization 7/22/2021    - Full Pulmonary Function Test With Bronchodilator; Future          Follow Up {Instructions Charge  Capture  Follow-up Communications :23}   Return in about 3 weeks (around 2/10/2022) for Video visit, HTN.    Patient was given instructions and counseling regarding her condition or for health maintenance advice. Please see specific information pulled into the AVS if appropriate.  Patient was instructed to call the [facility] Center with any questions, concerns, or worsening symptoms.

## 2022-02-04 ENCOUNTER — APPOINTMENT (OUTPATIENT)
Dept: PULMONOLOGY | Facility: HOSPITAL | Age: 52
End: 2022-02-04

## 2022-02-09 ENCOUNTER — TELEMEDICINE (OUTPATIENT)
Dept: CARDIOLOGY | Facility: HOSPITAL | Age: 52
End: 2022-02-09

## 2022-02-09 VITALS
HEART RATE: 68 BPM | DIASTOLIC BLOOD PRESSURE: 71 MMHG | WEIGHT: 164 LBS | BODY MASS INDEX: 29.06 KG/M2 | SYSTOLIC BLOOD PRESSURE: 120 MMHG | HEIGHT: 63 IN

## 2022-02-09 DIAGNOSIS — Z72.0 TOBACCO USE: ICD-10-CM

## 2022-02-09 DIAGNOSIS — R06.02 SHORTNESS OF BREATH: ICD-10-CM

## 2022-02-09 DIAGNOSIS — I25.10 CORONARY ARTERY DISEASE INVOLVING NATIVE CORONARY ARTERY OF NATIVE HEART WITHOUT ANGINA PECTORIS: ICD-10-CM

## 2022-02-09 DIAGNOSIS — I10 ESSENTIAL HYPERTENSION: Primary | ICD-10-CM

## 2022-02-09 PROCEDURE — 99214 OFFICE O/P EST MOD 30 MIN: CPT | Performed by: NURSE PRACTITIONER

## 2022-02-09 NOTE — PROGRESS NOTES
[facility], [facility] Heart [facility] Vascular    This was an audio and video enabled telemedicine encounter.    You have chosen to receive care through the use of telemedicine. Telemedicine enables health care providers at different locations to provide safe, effective, and convenient care through the use of technology. As with any health care service, there are risks associated with the use of telemedicine, including equipment failure, poor connections, and  issues.    • Do you understand the risks and benefits of telemedicine as I have explained them to you? Yes  • Have your questions regarding telemedicine been answered? Yes  • Do you consent to the use of telemedicine in your medical care today? Yes    Chief Complaint  Hypertension    Subjective    History of Present Illness {CC  Problem List  Visit  Diagnosis   Encounters  Notes  Medications  Labs  Result Review Imaging  Media :23}     Sarai Machuca presents to [facility] CARDIOLOGY for   History of Present Illness     51-year-old female with history of hypertension, hyperlipidemia, anxiety, tobacco use, CAD (nonobstructive).  Left heart catheterization 7/22/2021 with nonobstructive CAD.    Patient was trialed on isosorbide mononitrate due to continued chest discomfort that was relieved with sublingual nitroglycerin.  She developed hypotension and Imdur was then discontinued.  Her lisinopril and HCTZ was also decreased.  Patient developed a cough and urinary incontinence with ACE and hydrochlorothiazide being discontinued.  Currently on atenolol.    Last office visit blood pressure is elevated.  Initiated losartan.    Patient continues to smoke 1 pack/day.  Echocardiogram 7/21/2021 with normal EF no significant valvular disease.  PFTs ordered (not completed due to weather, needs to be rescheduled)    Pt reports doing well on her medications.  Blood pressures range from 1 teens to 140s.  No  "dizziness, near syncope, syncope.  Occasional intermittent chest discomfort, baseline.  Not exertional.  Patient like to reschedule PFTs.  Occasional palpitations at night, rare, very short duration.  Overall no concerns.    Objective     Vital Signs:   Vitals:    02/09/22 1312   BP: 120/71   Pulse: 68   Weight: 74.4 kg (164 lb)   Height: 160 cm (63\")     Body mass index is 29.05 kg/m².  Physical Exam  Vitals reviewed.   Constitutional:       General: She is not in acute distress.  Pulmonary:      Effort: Pulmonary effort is normal.   Skin:     Coloration: Skin is not pale.   Neurological:      Mental Status: She is alert.   Psychiatric:         Mood and Affect: Mood normal.         Behavior: Behavior normal. Behavior is cooperative.              Result Review  Data Reviewed:{ Labs  Result Review  Imaging  Med Tab  Media :23}     Lab Results   Component Value Date    WBC 9.69 07/22/2021    HGB 12.4 07/22/2021    HCT 38.7 07/22/2021    MCV 92.4 07/22/2021     07/22/2021     Lab Results   Component Value Date    GLUCOSE 103 (H) 07/22/2021    CALCIUM 8.8 07/22/2021     07/22/2021    K 3.8 07/22/2021    CO2 21.0 (L) 07/22/2021     (H) 07/22/2021    BUN 12 07/22/2021    CREATININE 0.76 07/22/2021    EGFRIFNONA 80 07/22/2021    BCR 15.8 07/22/2021    ANIONGAP 12.0 07/22/2021     Lab Results   Component Value Date    CHOL 137 07/22/2021     Lab Results   Component Value Date    TRIG 81 07/22/2021     Lab Results   Component Value Date    HDL 38 (L) 07/22/2021     Lab Results   Component Value Date    LDL 83 07/22/2021     Lab Results   Component Value Date    TSH 1.270 07/22/2021     Echocardiogram 7/21/2021 with normal EF no significant valvular disease  Nonobstructive CAD per left heart catheterization 7/22/2021  Assessment and Plan {CC Problem List  Visit Diagnosis  ROS  Review (Popup)  Health Maintenance  Quality  BestPractice  Medications  SmartSets  SnapShot Encounters  Media :23} "   1. Essential hypertension  Improved  Continue losartan, atenolol    2. Coronary artery disease involving native coronary artery of native heart without angina pectoris  Chest discomfort appears to be non-cardiac in origin.   continue asa, statin    3. Shortness of breath    - Full Pulmonary Function Test With Bronchodilator; Future    4. Tobacco use    - Full Pulmonary Function Test With Bronchodilator; Future      Pt to f/u with [facility] (will need to be rescheduled.  Will contact C scheduling).     Follow-up in the heart valve center as needed or as determined by cardiology    I spent 15 minutes caring for Sarai on this date of service. This time includes time spent by me in the following activities:preparing for the visit, reviewing tests, performing a medically appropriate examination and/or evaluation , counseling and educating the patient/family/caregiver, ordering medications, tests, or procedures and care coordination    Follow Up {Instructions Charge Capture  Follow-up Communications :23}   Return if symptoms worsen or fail to improve.    Patient was given instructions and counseling regarding her condition or for health maintenance advice. Please see specific information pulled into the AVS if appropriate.  Patient was instructed to call the [facility] Val[facility] Center with any questions, concerns, or worsening symptoms.

## (undated) DEVICE — MODEL BT2000 P/N 700287-012KIT CONTENTS: MANIFOLD WITH SALINE AND CONTRAST PORTS, SALINE TUBING WITH SPIKE AND HAND SYRINGE, TRANSDUCER: Brand: BT2000 AUTOMATED MANIFOLD KIT

## (undated) DEVICE — CATH IMG DRAGONFLY OPTIS 2.7F 135CM

## (undated) DEVICE — CATH DIAG EXPO M/ PK 5F FL4/FR4 PIG

## (undated) DEVICE — PK CATH CARD 10

## (undated) DEVICE — Device: Brand: ASAHI SION BLUE

## (undated) DEVICE — DEV COMP RAD PRELUDESYNC 24CM

## (undated) DEVICE — MODEL AT P65, P/N 701554-001KIT CONTENTS: HAND CONTROLLER, 3-WAY HIGH-PRESSURE STOPCOCK WITH ROTATING END AND PREMIUM HIGH-PRESSURE TUBING: Brand: ANGIOTOUCH® KIT

## (undated) DEVICE — GW PERIPH GUIDERIGHT STD/EXCHNG/J/TIP SS 0.035IN 5X260CM

## (undated) DEVICE — CATH DIAG EXPO .045 FL3  5F 100CM

## (undated) DEVICE — GLIDESHEATH SLENDER STAINLESS STEEL KIT: Brand: GLIDESHEATH SLENDER

## (undated) DEVICE — KT VLV HEMO MAP ACC PLS LG/BORE MTL/INTRO W/TORQ/DEV

## (undated) DEVICE — GUIDE CATHETER: Brand: MACH1™